# Patient Record
Sex: FEMALE | Race: WHITE | Employment: FULL TIME | URBAN - METROPOLITAN AREA
[De-identification: names, ages, dates, MRNs, and addresses within clinical notes are randomized per-mention and may not be internally consistent; named-entity substitution may affect disease eponyms.]

---

## 2018-01-31 PROBLEM — D64.9 ANEMIA: Status: ACTIVE | Noted: 2018-01-31

## 2018-03-27 ENCOUNTER — HOSPITAL ENCOUNTER (OUTPATIENT)
Age: 33
Discharge: HOME | End: 2018-03-27
Payer: COMMERCIAL

## 2018-03-27 LAB
ALBUMIN SERPL-MCNC: 4 G/DL (ref 3.5–5.2)
ALP BLD-CCNC: 43 U/L (ref 35–104)
ALT SERPL-CCNC: 10 U/L (ref 0–32)
ANION GAP SERPL CALCULATED.3IONS-SCNC: 13 MMOL/L (ref 7–16)
AST SERPL-CCNC: 10 U/L (ref 0–31)
BILIRUB SERPL-MCNC: 0.2 MG/DL (ref 0–1.2)
BUN BLDV-MCNC: 14 MG/DL (ref 6–20)
CALCIUM SERPL-MCNC: 9.4 MG/DL (ref 8.6–10.2)
CHLORIDE BLD-SCNC: 101 MMOL/L (ref 98–107)
CO2: 25 MMOL/L (ref 22–29)
CREAT SERPL-MCNC: 0.7 MG/DL (ref 0.5–1)
GFR AFRICAN AMERICAN: >60
GFR NON-AFRICAN AMERICAN: >60 ML/MIN/1.73
GLUCOSE BLD-MCNC: 82 MG/DL (ref 74–109)
HCT VFR BLD CALC: 25.5 % (ref 34–48)
HEMOGLOBIN: 7.1 G/DL (ref 11.5–15.5)
IRON SATURATION: 5 % (ref 15–50)
IRON: 17 MCG/DL (ref 37–145)
MCH RBC QN AUTO: 18.8 PG (ref 26–35)
MCHC RBC AUTO-ENTMCNC: 27.8 % (ref 32–34.5)
MCV RBC AUTO: 67.5 FL (ref 80–99.9)
PDW BLD-RTO: 24.7 FL (ref 11.5–15)
PLATELET # BLD: 436 E9/L (ref 130–450)
PMV BLD AUTO: 9.6 FL (ref 7–12)
POTASSIUM SERPL-SCNC: 4.2 MMOL/L (ref 3.5–5)
RBC # BLD: 3.78 E12/L (ref 3.5–5.5)
SODIUM BLD-SCNC: 139 MMOL/L (ref 132–146)
TOTAL IRON BINDING CAPACITY: 374 MCG/DL (ref 250–450)
TOTAL PROTEIN: 7 G/DL (ref 6.4–8.3)
WBC # BLD: 11 E9/L (ref 4.5–11.5)

## 2018-03-27 PROCEDURE — 36415 COLL VENOUS BLD VENIPUNCTURE: CPT

## 2018-03-27 PROCEDURE — 83550 IRON BINDING TEST: CPT

## 2018-03-27 PROCEDURE — 83540 ASSAY OF IRON: CPT

## 2018-03-27 PROCEDURE — 80053 COMPREHEN METABOLIC PANEL: CPT

## 2018-03-27 PROCEDURE — 85027 COMPLETE CBC AUTOMATED: CPT

## 2018-06-07 ENCOUNTER — HOSPITAL ENCOUNTER (INPATIENT)
Age: 33
LOS: 1 days | Discharge: HOME | DRG: 532 | End: 2018-06-07
Attending: INTERNAL MEDICINE | Admitting: INTERNAL MEDICINE
Payer: COMMERCIAL

## 2018-06-07 ENCOUNTER — HOSPITAL ENCOUNTER (OUTPATIENT)
Age: 33
Discharge: HOME | End: 2018-06-07
Payer: COMMERCIAL

## 2018-06-07 ENCOUNTER — HOSPITAL ENCOUNTER (EMERGENCY)
Age: 33
Discharge: ANOTHER HOSPITAL | DRG: 663 | End: 2018-06-07
Attending: EMERGENCY MEDICINE | Admitting: HOSPITALIST
Payer: COMMERCIAL

## 2018-06-07 VITALS
HEIGHT: 68 IN | OXYGEN SATURATION: 98 % | SYSTOLIC BLOOD PRESSURE: 130 MMHG | HEART RATE: 88 BPM | TEMPERATURE: 98.3 F | RESPIRATION RATE: 16 BRPM | DIASTOLIC BLOOD PRESSURE: 74 MMHG | WEIGHT: 280 LBS | BODY MASS INDEX: 42.44 KG/M2

## 2018-06-07 VITALS
HEART RATE: 77 BPM | TEMPERATURE: 98.4 F | WEIGHT: 269.1 LBS | HEIGHT: 68 IN | OXYGEN SATURATION: 98 % | DIASTOLIC BLOOD PRESSURE: 56 MMHG | BODY MASS INDEX: 40.78 KG/M2 | SYSTOLIC BLOOD PRESSURE: 119 MMHG | RESPIRATION RATE: 17 BRPM

## 2018-06-07 DIAGNOSIS — N92.4 MENORRHAGIA, PREMENOPAUSAL: ICD-10-CM

## 2018-06-07 DIAGNOSIS — D64.9 ANEMIA, UNSPECIFIED TYPE: ICD-10-CM

## 2018-06-07 DIAGNOSIS — D64.89 ANEMIA DUE TO OTHER CAUSE, NOT CLASSIFIED: Primary | ICD-10-CM

## 2018-06-07 PROBLEM — N93.8 DYSFUNCTIONAL UTERINE BLEEDING: Chronic | Status: ACTIVE | Noted: 2018-06-07

## 2018-06-07 PROBLEM — K27.9 PUD (PEPTIC ULCER DISEASE): Chronic | Status: ACTIVE | Noted: 2018-06-07

## 2018-06-07 PROBLEM — E66.01 MORBID OBESITY WITH BMI OF 40.0-44.9, ADULT (HCC): Chronic | Status: ACTIVE | Noted: 2018-06-07

## 2018-06-07 LAB
ABO/RH: NORMAL
ALBUMIN SERPL-MCNC: 3.9 G/DL (ref 3.5–5.2)
ALP BLD-CCNC: 50 U/L (ref 35–104)
ALT SERPL-CCNC: 8 U/L (ref 0–32)
ANION GAP SERPL CALCULATED.3IONS-SCNC: 14 MMOL/L (ref 7–16)
ANISOCYTOSIS: ABNORMAL
ANTIBODY SCREEN: NORMAL
AST SERPL-CCNC: 8 U/L (ref 0–31)
BACTERIA: ABNORMAL /HPF
BASOPHILS ABSOLUTE: 0.02 E9/L (ref 0–0.2)
BASOPHILS RELATIVE PERCENT: 0.2 % (ref 0–2)
BILIRUB SERPL-MCNC: 0.2 MG/DL (ref 0–1.2)
BILIRUBIN URINE: NEGATIVE
BLOOD BANK DISPENSE STATUS: NORMAL
BLOOD BANK DISPENSE STATUS: NORMAL
BLOOD BANK PRODUCT CODE: NORMAL
BLOOD BANK PRODUCT CODE: NORMAL
BLOOD, URINE: ABNORMAL
BPU ID: NORMAL
BPU ID: NORMAL
BUN BLDV-MCNC: 14 MG/DL (ref 6–20)
CALCIUM SERPL-MCNC: 8.9 MG/DL (ref 8.6–10.2)
CHLORIDE BLD-SCNC: 101 MMOL/L (ref 98–107)
CLARITY: ABNORMAL
CO2: 24 MMOL/L (ref 22–29)
COLOR: YELLOW
CREAT SERPL-MCNC: 0.8 MG/DL (ref 0.5–1)
DESCRIPTION BLOOD BANK: NORMAL
DESCRIPTION BLOOD BANK: NORMAL
EOSINOPHILS ABSOLUTE: 0.14 E9/L (ref 0.05–0.5)
EOSINOPHILS RELATIVE PERCENT: 1.1 % (ref 0–6)
FERRITIN: 3 NG/ML
FOLATE: 8.9 NG/ML (ref 4.8–24.2)
GFR AFRICAN AMERICAN: >60
GFR NON-AFRICAN AMERICAN: >60 ML/MIN/1.73
GLUCOSE BLD-MCNC: 145 MG/DL (ref 74–109)
GLUCOSE URINE: NEGATIVE MG/DL
HCG(URINE) PREGNANCY TEST: NEGATIVE
HCT VFR BLD CALC: 21.2 % (ref 34–48)
HCT VFR BLD CALC: 23.5 % (ref 34–48)
HEMOGLOBIN: 5.6 G/DL (ref 11.5–15.5)
HEMOGLOBIN: 6.2 G/DL (ref 11.5–15.5)
HYPOCHROMIA: ABNORMAL
IMMATURE GRANULOCYTES #: 0.06 E9/L
IMMATURE GRANULOCYTES %: 0.5 % (ref 0–5)
IMMATURE RETIC FRACT: 32.3 % (ref 3–15.9)
IRON SATURATION: 5 % (ref 15–50)
IRON: 16 MCG/DL (ref 37–145)
KETONES, URINE: NEGATIVE MG/DL
LACTIC ACID: 2.2 MMOL/L (ref 0.5–2.2)
LEUKOCYTE ESTERASE, URINE: NEGATIVE
LIPASE: 22 U/L (ref 13–60)
LYMPHOCYTES ABSOLUTE: 2.29 E9/L (ref 1.5–4)
LYMPHOCYTES RELATIVE PERCENT: 17.7 % (ref 20–42)
MCH RBC QN AUTO: 16.3 PG (ref 26–35)
MCH RBC QN AUTO: 16.5 PG (ref 26–35)
MCHC RBC AUTO-ENTMCNC: 26.4 % (ref 32–34.5)
MCHC RBC AUTO-ENTMCNC: 26.4 % (ref 32–34.5)
MCV RBC AUTO: 61.7 FL (ref 80–99.9)
MCV RBC AUTO: 62.4 FL (ref 80–99.9)
MONOCYTES ABSOLUTE: 0.47 E9/L (ref 0.1–0.95)
MONOCYTES RELATIVE PERCENT: 3.6 % (ref 2–12)
NEUTROPHILS ABSOLUTE: 9.93 E9/L (ref 1.8–7.3)
NEUTROPHILS RELATIVE PERCENT: 76.9 % (ref 43–80)
NITRITE, URINE: NEGATIVE
OVALOCYTES: ABNORMAL
PATHOLOGIST REVIEW: NORMAL
PDW BLD-RTO: 22.5 FL (ref 11.5–15)
PDW BLD-RTO: 22.5 FL (ref 11.5–15)
PH UA: 6 (ref 5–9)
PLATELET # BLD: 445 E9/L (ref 130–450)
PLATELET # BLD: 505 E9/L (ref 130–450)
PMV BLD AUTO: 10.8 FL (ref 7–12)
PMV BLD AUTO: 10.9 FL (ref 7–12)
POIKILOCYTES: ABNORMAL
POLYCHROMASIA: ABNORMAL
POTASSIUM SERPL-SCNC: 4 MMOL/L (ref 3.5–5)
PROTEIN UA: NEGATIVE MG/DL
RBC # BLD: 3.4 E12/L (ref 3.5–5.5)
RBC # BLD: 3.81 E12/L (ref 3.5–5.5)
RBC UA: >20 /HPF (ref 0–2)
RETIC HGB EQUIVALENT: 14.9 PG (ref 28.2–36.6)
RETICULOCYTE ABSOLUTE COUNT: 0.07 E12/L
RETICULOCYTE COUNT PCT: 2.2 % (ref 0.4–1.9)
SODIUM BLD-SCNC: 139 MMOL/L (ref 132–146)
SPECIFIC GRAVITY UA: 1.01 (ref 1–1.03)
TOTAL IRON BINDING CAPACITY: 343 MCG/DL (ref 250–450)
TOTAL PROTEIN: 6.7 G/DL (ref 6.4–8.3)
UROBILINOGEN, URINE: 0.2 E.U./DL
VITAMIN B-12: 414 PG/ML (ref 211–946)
WBC # BLD: 12.9 E9/L (ref 4.5–11.5)
WBC # BLD: 18.5 E9/L (ref 4.5–11.5)
WBC UA: ABNORMAL /HPF (ref 0–5)

## 2018-06-07 PROCEDURE — 81025 URINE PREGNANCY TEST: CPT

## 2018-06-07 PROCEDURE — 85027 COMPLETE CBC AUTOMATED: CPT

## 2018-06-07 PROCEDURE — G0378 HOSPITAL OBSERVATION PER HR: HCPCS

## 2018-06-07 PROCEDURE — 83540 ASSAY OF IRON: CPT

## 2018-06-07 PROCEDURE — 99285 EMERGENCY DEPT VISIT HI MDM: CPT

## 2018-06-07 PROCEDURE — 82728 ASSAY OF FERRITIN: CPT

## 2018-06-07 PROCEDURE — 86900 BLOOD TYPING SEROLOGIC ABO: CPT

## 2018-06-07 PROCEDURE — 96366 THER/PROPH/DIAG IV INF ADDON: CPT

## 2018-06-07 PROCEDURE — 80053 COMPREHEN METABOLIC PANEL: CPT

## 2018-06-07 PROCEDURE — A0428 BLS: HCPCS

## 2018-06-07 PROCEDURE — 2580000003 HC RX 258: Performed by: INTERNAL MEDICINE

## 2018-06-07 PROCEDURE — G0379 DIRECT REFER HOSPITAL OBSERV: HCPCS

## 2018-06-07 PROCEDURE — A0425 GROUND MILEAGE: HCPCS

## 2018-06-07 PROCEDURE — 36415 COLL VENOUS BLD VENIPUNCTURE: CPT

## 2018-06-07 PROCEDURE — 82746 ASSAY OF FOLIC ACID SERUM: CPT

## 2018-06-07 PROCEDURE — 86850 RBC ANTIBODY SCREEN: CPT

## 2018-06-07 PROCEDURE — 86901 BLOOD TYPING SEROLOGIC RH(D): CPT

## 2018-06-07 PROCEDURE — 83605 ASSAY OF LACTIC ACID: CPT

## 2018-06-07 PROCEDURE — P9016 RBC LEUKOCYTES REDUCED: HCPCS

## 2018-06-07 PROCEDURE — 36430 TRANSFUSION BLD/BLD COMPNT: CPT

## 2018-06-07 PROCEDURE — 85045 AUTOMATED RETICULOCYTE COUNT: CPT

## 2018-06-07 PROCEDURE — 96365 THER/PROPH/DIAG IV INF INIT: CPT

## 2018-06-07 PROCEDURE — 1200000000 HC SEMI PRIVATE

## 2018-06-07 PROCEDURE — 82607 VITAMIN B-12: CPT

## 2018-06-07 PROCEDURE — 81001 URINALYSIS AUTO W/SCOPE: CPT

## 2018-06-07 PROCEDURE — 83690 ASSAY OF LIPASE: CPT

## 2018-06-07 PROCEDURE — 6370000000 HC RX 637 (ALT 250 FOR IP): Performed by: INTERNAL MEDICINE

## 2018-06-07 PROCEDURE — 85025 COMPLETE CBC W/AUTO DIFF WBC: CPT

## 2018-06-07 PROCEDURE — 83550 IRON BINDING TEST: CPT

## 2018-06-07 PROCEDURE — 86920 COMPATIBILITY TEST SPIN: CPT

## 2018-06-07 PROCEDURE — 6360000002 HC RX W HCPCS: Performed by: INTERNAL MEDICINE

## 2018-06-07 RX ORDER — SODIUM CHLORIDE 0.9 % (FLUSH) 0.9 %
10 SYRINGE (ML) INJECTION EVERY 12 HOURS SCHEDULED
Status: DISCONTINUED | OUTPATIENT
Start: 2018-06-07 | End: 2018-06-07 | Stop reason: HOSPADM

## 2018-06-07 RX ORDER — PANTOPRAZOLE SODIUM 40 MG/1
40 TABLET, DELAYED RELEASE ORAL
Status: DISCONTINUED | OUTPATIENT
Start: 2018-06-07 | End: 2018-06-07 | Stop reason: HOSPADM

## 2018-06-07 RX ORDER — FERROUS SULFATE 325(65) MG
325 TABLET ORAL
Qty: 90 TABLET | Refills: 0 | Status: SHIPPED | OUTPATIENT
Start: 2018-06-07 | End: 2021-10-08

## 2018-06-07 RX ORDER — 0.9 % SODIUM CHLORIDE 0.9 %
250 INTRAVENOUS SOLUTION INTRAVENOUS ONCE
Status: DISCONTINUED | OUTPATIENT
Start: 2018-06-07 | End: 2018-06-07 | Stop reason: HOSPADM

## 2018-06-07 RX ORDER — CYANOCOBALAMIN 1000 UG/ML
1000 INJECTION INTRAMUSCULAR; SUBCUTANEOUS ONCE
Status: COMPLETED | OUTPATIENT
Start: 2018-06-07 | End: 2018-06-07

## 2018-06-07 RX ORDER — ONDANSETRON 2 MG/ML
4 INJECTION INTRAMUSCULAR; INTRAVENOUS EVERY 6 HOURS PRN
Status: DISCONTINUED | OUTPATIENT
Start: 2018-06-07 | End: 2018-06-07 | Stop reason: HOSPADM

## 2018-06-07 RX ORDER — ACETAMINOPHEN 325 MG/1
650 TABLET ORAL ONCE
Status: COMPLETED | OUTPATIENT
Start: 2018-06-07 | End: 2018-06-07

## 2018-06-07 RX ORDER — SODIUM CHLORIDE 0.9 % (FLUSH) 0.9 %
10 SYRINGE (ML) INJECTION PRN
Status: DISCONTINUED | OUTPATIENT
Start: 2018-06-07 | End: 2018-06-07 | Stop reason: HOSPADM

## 2018-06-07 RX ORDER — PANTOPRAZOLE SODIUM 40 MG/1
40 TABLET, DELAYED RELEASE ORAL
Qty: 30 TABLET | Refills: 0 | Status: SHIPPED | OUTPATIENT
Start: 2018-06-08 | End: 2019-05-31

## 2018-06-07 RX ADMIN — CYANOCOBALAMIN 1000 MCG: 1000 INJECTION, SOLUTION INTRAMUSCULAR at 08:39

## 2018-06-07 RX ADMIN — ACETAMINOPHEN 650 MG: 325 TABLET ORAL at 18:52

## 2018-06-07 RX ADMIN — Medication 10 ML: at 11:07

## 2018-06-07 RX ADMIN — SODIUM CHLORIDE 25 MG: 9 INJECTION, SOLUTION INTRAVENOUS at 08:39

## 2018-06-07 RX ADMIN — PANTOPRAZOLE SODIUM 40 MG: 40 TABLET, DELAYED RELEASE ORAL at 08:39

## 2018-06-07 RX ADMIN — Medication 10 ML: at 08:40

## 2018-06-07 RX ADMIN — SODIUM CHLORIDE 100 MG: 9 INJECTION, SOLUTION INTRAVENOUS at 09:56

## 2018-06-07 ASSESSMENT — PAIN DESCRIPTION - LOCATION
LOCATION: ABDOMEN
LOCATION: ABDOMEN
LOCATION: HEAD

## 2018-06-07 ASSESSMENT — PAIN DESCRIPTION - FREQUENCY
FREQUENCY: INTERMITTENT
FREQUENCY: INTERMITTENT
FREQUENCY: CONTINUOUS

## 2018-06-07 ASSESSMENT — PAIN DESCRIPTION - DESCRIPTORS
DESCRIPTORS: ACHING;STABBING
DESCRIPTORS: HEADACHE
DESCRIPTORS: SQUEEZING

## 2018-06-07 ASSESSMENT — PAIN DESCRIPTION - PAIN TYPE
TYPE: ACUTE PAIN

## 2018-06-07 ASSESSMENT — PAIN SCALES - GENERAL
PAINLEVEL_OUTOF10: 8
PAINLEVEL_OUTOF10: 7
PAINLEVEL_OUTOF10: 6
PAINLEVEL_OUTOF10: 0

## 2018-06-07 ASSESSMENT — PAIN DESCRIPTION - ONSET: ONSET: GRADUAL

## 2018-06-07 NOTE — ED PROVIDER NOTES
HPI:  6/7/18, Time: 1:25 AM         Janeen Seth is a 27 y.o. female presenting to the ED for menorrhagia and crampy abdominal pain, beginning 3 weeks ago. The patient states she has been symptomatic for approximately 3 weeks, but symptoms worsened over the last several days. Patient does have history for the last 12 months requiring a transfusion due to heavy vaginal bleeding or menorrhagia. She is complaining also of some upper abdominal discomfort. Patient denies any melanotic stools. Patient has remained stable her entire time here. Patient will be admitted for transfusion and further workup. I did speak with Dr. Twyla Lainez and he will accept the patient at the [address] [place] [address]. I also spoke with the gynecologist Dr. Beverly Noland. She is stable upon discharge. Patient denies fever/chills, cough, congestion, chest pain, shortness of breath, edema, headache, visual disturbances, focal paresthesias, focal weakness, abdominal pain, nausea, vomiting, diarrhea, constipation, dysuria, hematuria, trauma, neck or back pain or other complaints. ROS:   Pertinent positives and negatives are stated within HPI, all other systems reviewed and are negative.      --------------------------------------------- PAST HISTORY ---------------------------------------------  Past Medical History:  has a past medical history of Ovarian cyst.    Past Surgical History:  has a past surgical history that includes Endoscopy, colon, diagnostic (02/01/2018). Social History:  reports that she has been smoking Cigarettes. She has a 10.00 pack-year smoking history. She does not have any smokeless tobacco history on file. She reports that she does not drink alcohol or use drugs. Family History: family history is not on file. The patients home medications have been reviewed.     Allergies: Sulfa antibiotics        ---------------------------------------------------PHYSICAL EXAM--------------------------------------    Constitutional:  Well developed, well nourished, no acute distress, non-toxic appearance   Eyes:  PERRL, conjunctiva normal, EOMI, sclera is pale. HENT:  Atraumatic, external ears normal, nose normal, oropharynx moist. Neck- normal range of motion, no tenderness, supple   Respiratory:  No respiratory distress, normal breath sounds, no rales, no wheezing   Cardiovascular:  Normal rate, normal rhythm, no murmurs, no gallops, no rubs. Radial and DP pulses 2+ bilaterally. GI:  Soft, nondistended, normal bowel sounds, nontender, no organomegaly, no mass, no rebound, no guarding   :  No costovertebral angle tenderness   Musculoskeletal:  No edema, no tenderness, no deformities. Back- no tenderness  Integument:  Well hydrated, no rash. Adequate perfusion. Lymphatic:  No lymphadenopathy noted   Neurologic:  Alert & oriented x 3, CN 2-12 normal, normal motor function, normal sensory function, no focal deficits noted. Psychiatric:  Speech and behavior appropriate       -------------------------------------------------- RESULTS -------------------------------------------------  I have personally reviewed all laboratory and imaging results for this patient. Results are listed below.      LABS:  Results for orders placed or performed during the hospital encounter of 06/07/18   Urinalysis   Result Value Ref Range    Color, UA Yellow Straw/Yellow    Clarity, UA CLOUDY (A) Clear    Glucose, Ur Negative Negative mg/dL    Bilirubin Urine Negative Negative    Ketones, Urine Negative Negative mg/dL    Specific Gravity, UA 1.015 1.005 - 1.030    Blood, Urine LARGE (A) Negative    pH, UA 6.0 5.0 - 9.0    Protein, UA Negative Negative mg/dL    Urobilinogen, Urine 0.2 <2.0 E.U./dL    Nitrite, Urine Negative Negative    Leukocyte Esterase, Urine Negative Negative   Pregnancy, urine   Result Value Ref Range    HCG(Urine) Pregnancy Test NEGATIVE NEGATIVE   Microscopic Urinalysis   Result Value Ref Range    WBC, UA 0-1 0 - 5 /HPF    RBC, UA >20 0 - 2 /HPF

## 2018-06-07 NOTE — ED NOTES
Patient aware of potential admission and wait for return phone call from PMD. Skin WNL without any open areas.       Jj Biswas RN  06/07/18 9429

## 2018-07-17 ENCOUNTER — HOSPITAL ENCOUNTER (OUTPATIENT)
Age: 33
Discharge: HOME | End: 2018-07-17
Payer: COMMERCIAL

## 2018-07-17 LAB
ABO/RH: NORMAL
ANTIBODY SCREEN: NORMAL
HCG QUALITATIVE: NEGATIVE
HCT VFR BLD CALC: 25.2 % (ref 34–48)
HEMOGLOBIN: 7.1 G/DL (ref 11.5–15.5)
MCH RBC QN AUTO: 19.8 PG (ref 26–35)
MCHC RBC AUTO-ENTMCNC: 28.2 % (ref 32–34.5)
MCV RBC AUTO: 70.4 FL (ref 80–99.9)
PDW BLD-RTO: 25.9 FL (ref 11.5–15)
PLATELET # BLD: 368 E9/L (ref 130–450)
PMV BLD AUTO: 9.8 FL (ref 7–12)
RBC # BLD: 3.58 E12/L (ref 3.5–5.5)
WBC # BLD: 10.8 E9/L (ref 4.5–11.5)

## 2018-07-17 PROCEDURE — 86901 BLOOD TYPING SEROLOGIC RH(D): CPT

## 2018-07-17 PROCEDURE — 86923 COMPATIBILITY TEST ELECTRIC: CPT

## 2018-07-17 PROCEDURE — 84703 CHORIONIC GONADOTROPIN ASSAY: CPT

## 2018-07-17 PROCEDURE — 86850 RBC ANTIBODY SCREEN: CPT

## 2018-07-17 PROCEDURE — 36415 COLL VENOUS BLD VENIPUNCTURE: CPT

## 2018-07-17 PROCEDURE — 85027 COMPLETE CBC AUTOMATED: CPT

## 2018-07-17 PROCEDURE — 86900 BLOOD TYPING SEROLOGIC ABO: CPT

## 2018-07-17 NOTE — PROGRESS NOTES
Phone call to Filomena Love at Dr. Nita Moreno office re:orders and H&P, she will call the doctor for orders, he will do H&P at bedside.

## 2018-07-18 ENCOUNTER — HOSPITAL ENCOUNTER (OUTPATIENT)
Age: 33
Setting detail: OUTPATIENT SURGERY
Discharge: HOME | End: 2018-07-18
Attending: OBSTETRICS & GYNECOLOGY | Admitting: OBSTETRICS & GYNECOLOGY
Payer: COMMERCIAL

## 2018-07-18 ENCOUNTER — ANESTHESIA EVENT (OUTPATIENT)
Dept: OPERATING ROOM | Age: 33
End: 2018-07-18
Payer: COMMERCIAL

## 2018-07-18 ENCOUNTER — ANESTHESIA (OUTPATIENT)
Dept: OPERATING ROOM | Age: 33
End: 2018-07-18
Payer: COMMERCIAL

## 2018-07-18 VITALS
SYSTOLIC BLOOD PRESSURE: 112 MMHG | DIASTOLIC BLOOD PRESSURE: 56 MMHG | OXYGEN SATURATION: 100 % | RESPIRATION RATE: 3 BRPM

## 2018-07-18 VITALS
BODY MASS INDEX: 40.47 KG/M2 | TEMPERATURE: 96.3 F | RESPIRATION RATE: 16 BRPM | DIASTOLIC BLOOD PRESSURE: 60 MMHG | OXYGEN SATURATION: 96 % | SYSTOLIC BLOOD PRESSURE: 114 MMHG | WEIGHT: 267 LBS | HEIGHT: 68 IN | HEART RATE: 67 BPM

## 2018-07-18 PROCEDURE — 6360000002 HC RX W HCPCS

## 2018-07-18 PROCEDURE — 2580000003 HC RX 258: Performed by: OBSTETRICS & GYNECOLOGY

## 2018-07-18 PROCEDURE — 7100000010 HC PHASE II RECOVERY - FIRST 15 MIN: Performed by: OBSTETRICS & GYNECOLOGY

## 2018-07-18 PROCEDURE — 88305 TISSUE EXAM BY PATHOLOGIST: CPT

## 2018-07-18 PROCEDURE — 2500000003 HC RX 250 WO HCPCS: Performed by: NURSE ANESTHETIST, CERTIFIED REGISTERED

## 2018-07-18 PROCEDURE — 3600000003 HC SURGERY LEVEL 3 BASE: Performed by: OBSTETRICS & GYNECOLOGY

## 2018-07-18 PROCEDURE — 6360000002 HC RX W HCPCS: Performed by: NURSE ANESTHETIST, CERTIFIED REGISTERED

## 2018-07-18 PROCEDURE — 7100000011 HC PHASE II RECOVERY - ADDTL 15 MIN: Performed by: OBSTETRICS & GYNECOLOGY

## 2018-07-18 PROCEDURE — 3700000000 HC ANESTHESIA ATTENDED CARE: Performed by: OBSTETRICS & GYNECOLOGY

## 2018-07-18 PROCEDURE — 6360000002 HC RX W HCPCS: Performed by: ANESTHESIOLOGY

## 2018-07-18 PROCEDURE — 2580000003 HC RX 258: Performed by: NURSE ANESTHETIST, CERTIFIED REGISTERED

## 2018-07-18 PROCEDURE — 3600000013 HC SURGERY LEVEL 3 ADDTL 15MIN: Performed by: OBSTETRICS & GYNECOLOGY

## 2018-07-18 PROCEDURE — 7100000000 HC PACU RECOVERY - FIRST 15 MIN: Performed by: OBSTETRICS & GYNECOLOGY

## 2018-07-18 PROCEDURE — 7100000001 HC PACU RECOVERY - ADDTL 15 MIN: Performed by: OBSTETRICS & GYNECOLOGY

## 2018-07-18 PROCEDURE — 3700000001 HC ADD 15 MINUTES (ANESTHESIA): Performed by: OBSTETRICS & GYNECOLOGY

## 2018-07-18 PROCEDURE — 2709999900 HC NON-CHARGEABLE SUPPLY: Performed by: OBSTETRICS & GYNECOLOGY

## 2018-07-18 RX ORDER — FENTANYL CITRATE 50 UG/ML
INJECTION, SOLUTION INTRAMUSCULAR; INTRAVENOUS PRN
Status: DISCONTINUED | OUTPATIENT
Start: 2018-07-18 | End: 2018-07-18 | Stop reason: SDUPTHER

## 2018-07-18 RX ORDER — MEPERIDINE HYDROCHLORIDE 25 MG/ML
INJECTION INTRAMUSCULAR; INTRAVENOUS; SUBCUTANEOUS
Status: COMPLETED
Start: 2018-07-18 | End: 2018-07-18

## 2018-07-18 RX ORDER — SODIUM CHLORIDE 0.9 % (FLUSH) 0.9 %
10 SYRINGE (ML) INJECTION PRN
Status: DISCONTINUED | OUTPATIENT
Start: 2018-07-18 | End: 2018-07-18 | Stop reason: HOSPADM

## 2018-07-18 RX ORDER — MIDAZOLAM HYDROCHLORIDE 1 MG/ML
INJECTION INTRAMUSCULAR; INTRAVENOUS PRN
Status: DISCONTINUED | OUTPATIENT
Start: 2018-07-18 | End: 2018-07-18 | Stop reason: SDUPTHER

## 2018-07-18 RX ORDER — SODIUM CHLORIDE 0.9 % (FLUSH) 0.9 %
10 SYRINGE (ML) INJECTION EVERY 12 HOURS SCHEDULED
Status: DISCONTINUED | OUTPATIENT
Start: 2018-07-18 | End: 2018-07-18 | Stop reason: HOSPADM

## 2018-07-18 RX ORDER — FENTANYL CITRATE 50 UG/ML
25 INJECTION, SOLUTION INTRAMUSCULAR; INTRAVENOUS EVERY 5 MIN PRN
Status: DISCONTINUED | OUTPATIENT
Start: 2018-07-18 | End: 2018-07-18 | Stop reason: HOSPADM

## 2018-07-18 RX ORDER — DEXAMETHASONE SODIUM PHOSPHATE 10 MG/ML
INJECTION, SOLUTION INTRAMUSCULAR; INTRAVENOUS PRN
Status: DISCONTINUED | OUTPATIENT
Start: 2018-07-18 | End: 2018-07-18 | Stop reason: SDUPTHER

## 2018-07-18 RX ORDER — SODIUM CHLORIDE, SODIUM LACTATE, POTASSIUM CHLORIDE, CALCIUM CHLORIDE 600; 310; 30; 20 MG/100ML; MG/100ML; MG/100ML; MG/100ML
INJECTION, SOLUTION INTRAVENOUS CONTINUOUS PRN
Status: DISCONTINUED | OUTPATIENT
Start: 2018-07-18 | End: 2018-07-18 | Stop reason: SDUPTHER

## 2018-07-18 RX ORDER — HYDROCODONE BITARTRATE AND ACETAMINOPHEN 5; 325 MG/1; MG/1
2 TABLET ORAL PRN
Status: DISCONTINUED | OUTPATIENT
Start: 2018-07-18 | End: 2018-07-18 | Stop reason: HOSPADM

## 2018-07-18 RX ORDER — FENTANYL CITRATE 50 UG/ML
INJECTION, SOLUTION INTRAMUSCULAR; INTRAVENOUS
Status: DISCONTINUED
Start: 2018-07-18 | End: 2018-07-18 | Stop reason: HOSPADM

## 2018-07-18 RX ORDER — GLYCOPYRROLATE 1 MG/5 ML
SYRINGE (ML) INTRAVENOUS PRN
Status: DISCONTINUED | OUTPATIENT
Start: 2018-07-18 | End: 2018-07-18 | Stop reason: SDUPTHER

## 2018-07-18 RX ORDER — KETOROLAC TROMETHAMINE 30 MG/ML
INJECTION, SOLUTION INTRAMUSCULAR; INTRAVENOUS PRN
Status: DISCONTINUED | OUTPATIENT
Start: 2018-07-18 | End: 2018-07-18 | Stop reason: SDUPTHER

## 2018-07-18 RX ORDER — PROPOFOL 10 MG/ML
INJECTION, EMULSION INTRAVENOUS PRN
Status: DISCONTINUED | OUTPATIENT
Start: 2018-07-18 | End: 2018-07-18 | Stop reason: SDUPTHER

## 2018-07-18 RX ORDER — ONDANSETRON 2 MG/ML
INJECTION INTRAMUSCULAR; INTRAVENOUS PRN
Status: DISCONTINUED | OUTPATIENT
Start: 2018-07-18 | End: 2018-07-18 | Stop reason: SDUPTHER

## 2018-07-18 RX ORDER — KETOROLAC TROMETHAMINE 10 MG/1
10 TABLET, FILM COATED ORAL EVERY 6 HOURS PRN
Qty: 20 TABLET | Refills: 0 | Status: SHIPPED | OUTPATIENT
Start: 2018-07-18 | End: 2019-05-31

## 2018-07-18 RX ORDER — ONDANSETRON 2 MG/ML
4 INJECTION INTRAMUSCULAR; INTRAVENOUS EVERY 6 HOURS PRN
Status: DISCONTINUED | OUTPATIENT
Start: 2018-07-18 | End: 2018-07-18 | Stop reason: HOSPADM

## 2018-07-18 RX ORDER — LIDOCAINE HYDROCHLORIDE 20 MG/ML
INJECTION, SOLUTION INFILTRATION; PERINEURAL PRN
Status: DISCONTINUED | OUTPATIENT
Start: 2018-07-18 | End: 2018-07-18 | Stop reason: SDUPTHER

## 2018-07-18 RX ORDER — MEPERIDINE HYDROCHLORIDE 25 MG/ML
12.5 INJECTION INTRAMUSCULAR; INTRAVENOUS; SUBCUTANEOUS EVERY 5 MIN PRN
Status: DISCONTINUED | OUTPATIENT
Start: 2018-07-18 | End: 2018-07-18 | Stop reason: HOSPADM

## 2018-07-18 RX ORDER — SODIUM CHLORIDE, SODIUM LACTATE, POTASSIUM CHLORIDE, CALCIUM CHLORIDE 600; 310; 30; 20 MG/100ML; MG/100ML; MG/100ML; MG/100ML
INJECTION, SOLUTION INTRAVENOUS CONTINUOUS
Status: DISCONTINUED | OUTPATIENT
Start: 2018-07-18 | End: 2018-07-18 | Stop reason: HOSPADM

## 2018-07-18 RX ORDER — HYDROCODONE BITARTRATE AND ACETAMINOPHEN 5; 325 MG/1; MG/1
1 TABLET ORAL PRN
Status: DISCONTINUED | OUTPATIENT
Start: 2018-07-18 | End: 2018-07-18 | Stop reason: HOSPADM

## 2018-07-18 RX ORDER — MORPHINE SULFATE 2 MG/ML
2 INJECTION, SOLUTION INTRAMUSCULAR; INTRAVENOUS EVERY 5 MIN PRN
Status: DISCONTINUED | OUTPATIENT
Start: 2018-07-18 | End: 2018-07-18 | Stop reason: HOSPADM

## 2018-07-18 RX ADMIN — KETOROLAC TROMETHAMINE 30 MG: 30 INJECTION, SOLUTION INTRAMUSCULAR; INTRAVENOUS at 10:08

## 2018-07-18 RX ADMIN — SODIUM CHLORIDE, POTASSIUM CHLORIDE, SODIUM LACTATE AND CALCIUM CHLORIDE: 600; 310; 30; 20 INJECTION, SOLUTION INTRAVENOUS at 09:43

## 2018-07-18 RX ADMIN — DEXAMETHASONE SODIUM PHOSPHATE 10 MG: 10 INJECTION, SOLUTION INTRAMUSCULAR; INTRAVENOUS at 10:06

## 2018-07-18 RX ADMIN — FENTANYL CITRATE 50 MCG: 50 INJECTION, SOLUTION INTRAMUSCULAR; INTRAVENOUS at 09:50

## 2018-07-18 RX ADMIN — ONDANSETRON HYDROCHLORIDE 4 MG: 2 INJECTION, SOLUTION INTRAMUSCULAR; INTRAVENOUS at 10:06

## 2018-07-18 RX ADMIN — Medication 0.2 MG: at 09:58

## 2018-07-18 RX ADMIN — FENTANYL CITRATE 25 MCG: 50 INJECTION INTRAMUSCULAR; INTRAVENOUS at 10:55

## 2018-07-18 RX ADMIN — LIDOCAINE HYDROCHLORIDE 60 MG: 20 INJECTION, SOLUTION INFILTRATION; PERINEURAL at 09:50

## 2018-07-18 RX ADMIN — MIDAZOLAM 2 MG: 1 INJECTION INTRAMUSCULAR; INTRAVENOUS at 09:43

## 2018-07-18 RX ADMIN — MEPERIDINE HYDROCHLORIDE 12.5 MG: 25 INJECTION INTRAMUSCULAR; INTRAVENOUS; SUBCUTANEOUS at 10:28

## 2018-07-18 RX ADMIN — PROPOFOL 150 MG: 10 INJECTION, EMULSION INTRAVENOUS at 09:50

## 2018-07-18 RX ADMIN — SODIUM CHLORIDE, POTASSIUM CHLORIDE, SODIUM LACTATE AND CALCIUM CHLORIDE: 600; 310; 30; 20 INJECTION, SOLUTION INTRAVENOUS at 08:37

## 2018-07-18 RX ADMIN — FENTANYL CITRATE 25 MCG: 50 INJECTION INTRAMUSCULAR; INTRAVENOUS at 11:00

## 2018-07-18 ASSESSMENT — PULMONARY FUNCTION TESTS
PIF_VALUE: 2
PIF_VALUE: 18
PIF_VALUE: 18
PIF_VALUE: 14
PIF_VALUE: 22
PIF_VALUE: 3
PIF_VALUE: 17
PIF_VALUE: 18
PIF_VALUE: 18
PIF_VALUE: 3
PIF_VALUE: 18
PIF_VALUE: 22
PIF_VALUE: 4
PIF_VALUE: 17
PIF_VALUE: 18
PIF_VALUE: 0
PIF_VALUE: 18
PIF_VALUE: 0
PIF_VALUE: 1
PIF_VALUE: 18
PIF_VALUE: 17
PIF_VALUE: 3
PIF_VALUE: 18
PIF_VALUE: 21
PIF_VALUE: 18
PIF_VALUE: 17
PIF_VALUE: 18
PIF_VALUE: 21
PIF_VALUE: 18
PIF_VALUE: 19
PIF_VALUE: 17

## 2018-07-18 ASSESSMENT — PAIN SCALES - GENERAL
PAINLEVEL_OUTOF10: 2
PAINLEVEL_OUTOF10: 5
PAINLEVEL_OUTOF10: 6
PAINLEVEL_OUTOF10: 0
PAINLEVEL_OUTOF10: 4

## 2018-07-18 ASSESSMENT — LIFESTYLE VARIABLES: SMOKING_STATUS: 1

## 2018-07-18 ASSESSMENT — PAIN DESCRIPTION - FREQUENCY: FREQUENCY: CONTINUOUS

## 2018-07-18 ASSESSMENT — PAIN DESCRIPTION - DESCRIPTORS
DESCRIPTORS: CRAMPING
DESCRIPTORS: CRAMPING

## 2018-07-18 ASSESSMENT — PAIN DESCRIPTION - PAIN TYPE
TYPE: ACUTE PAIN;SURGICAL PAIN
TYPE: SURGICAL PAIN

## 2018-07-18 ASSESSMENT — PAIN DESCRIPTION - LOCATION
LOCATION: ABDOMEN
LOCATION: ABDOMEN

## 2018-07-18 ASSESSMENT — PAIN DESCRIPTION - PROGRESSION: CLINICAL_PROGRESSION: NOT CHANGED

## 2018-07-18 ASSESSMENT — PAIN - FUNCTIONAL ASSESSMENT: PAIN_FUNCTIONAL_ASSESSMENT: 0-10

## 2018-07-18 ASSESSMENT — PAIN DESCRIPTION - ONSET: ONSET: ON-GOING

## 2018-07-18 NOTE — ANESTHESIA PRE PROCEDURE
Department of Anesthesiology  Preprocedure Note       Name:  Agustín Bar   Age:  26 y.o.  :  1985                                          MRN:  [id number]         Date:  2018      Surgeon: Sergio Brush):  Renaldo Webb MD    Procedure: Procedure(s):  DILATATION AND CURETTAGE HYSTEROSCOPY, NOVASURE ABLATION    Medications prior to admission:   Prior to Admission medications    Medication Sig Start Date End Date Taking? Authorizing Provider   pantoprazole (PROTONIX) 40 MG tablet Take 1 tablet by mouth every morning (before breakfast) 18  Yes Ryanne Ortiz MD   ferrous sulfate (TONY-DANNIE) 325 (65 Fe) MG tablet Take 1 tablet by mouth 3 times daily (with meals) 18   Ryanne Ortiz MD       Current medications:    Current Facility-Administered Medications   Medication Dose Route Frequency Provider Last Rate Last Dose    lactated ringers infusion   Intravenous Continuous Renaldo Webb MD        sodium chloride flush 0.9 % injection 10 mL  10 mL Intravenous 2 times per day Renaldo Webb MD        sodium chloride flush 0.9 % injection 10 mL  10 mL Intravenous PRN Renaldo Webb MD        ondansetron (ZOFRAN) injection 4 mg  4 mg Intravenous Q6H PRN Renaldo Webb MD           Allergies:     Allergies   Allergen Reactions    Sulfa Antibiotics Rash       Problem List:    Patient Active Problem List   Diagnosis Code    Anemia D64.9    PUD (peptic ulcer disease) K27.9    Morbid obesity with BMI of 40.0-44.9, adult ([facility] E66.01, Z68.41    Dysfunctional uterine bleeding N93.8       Past Medical History:        Diagnosis Date    Anemia     GI (gastrointestinal bleed)     Ovarian cyst        Past Surgical History:        Procedure Laterality Date    COLONOSCOPY      CYST REMOVAL Left     wrist    ENDOSCOPY, COLON, DIAGNOSTIC  2018       Social History:    Social History   Substance Use Topics    Smoking status: Current Every Day Smoker     Packs/day: 1.00     Years: 10.00     Types: Cigarettes    Smokeless tobacco: Never Used    Alcohol use No                                Ready to quit: Not Answered  Counseling given: Not Answered      Vital Signs (Current):   Vitals:    07/17/18 0854 07/18/18 0814   BP:  120/82   Pulse:  80   Resp:  16   Temp:  98.5 °F (36.9 °C)   TempSrc:  Temporal   SpO2:  98%   Weight: 265 lb (120.2 kg) 267 lb (121.1 kg)   Height: 5' 8\" (1.727 m)                                               BP Readings from Last 3 Encounters:   07/18/18 120/82   06/07/18 (!) 119/56   06/07/18 130/74       NPO Status: Time of last liquid consumption: 2000                        Time of last solid consumption: 1800                        Date of last liquid consumption: 07/17/18                        Date of last solid food consumption: 07/17/18    BMI:   Wt Readings from Last 3 Encounters:   07/18/18 267 lb (121.1 kg)   06/07/18 269 lb 1.6 oz (122.1 kg)   06/07/18 280 lb (127 kg)     Body mass index is 40.6 kg/m². CBC:   Lab Results   Component Value Date    WBC 10.8 07/17/2018    RBC 3.58 07/17/2018    HGB 7.1 07/17/2018    HCT 25.2 07/17/2018    MCV 70.4 07/17/2018    RDW 25.9 07/17/2018     07/17/2018       CMP:   Lab Results   Component Value Date     06/07/2018    K 4.0 06/07/2018     06/07/2018    CO2 24 06/07/2018    BUN 14 06/07/2018    CREATININE 0.8 06/07/2018    GFRAA >60 06/07/2018    LABGLOM >60 06/07/2018    GLUCOSE 145 06/07/2018    GLUCOSE 92 02/11/2011    PROT 6.7 06/07/2018    CALCIUM 8.9 06/07/2018    BILITOT 0.2 06/07/2018    ALKPHOS 50 06/07/2018    AST 8 06/07/2018    ALT 8 06/07/2018       POC Tests: No results for input(s): POCGLU, POCNA, POCK, POCCL, POCBUN, POCHEMO, POCHCT in the last 72 hours.     Coags:   Lab Results   Component Value Date    PROTIME 12.3 06/14/2013    INR 1.2 06/14/2013       HCG (If Applicable):   Lab Results   Component Value Date    PREGTESTUR NEGATIVE 06/07/2018        ABGs: No results found for: PHART, PO2ART, OYJ6QJQ, IOS5TER, BEART, V6KJXIWW     Type & Screen (If Applicable):  No results found for: LABABO, [address]    Anesthesia Evaluation  Patient summary reviewed no history of anesthetic complications:   Airway: Mallampati: I  TM distance: >3 FB   Neck ROM: full  Mouth opening: > = 3 FB Dental: normal exam         Pulmonary: breath sounds clear to auscultation  (+) current smoker                           Cardiovascular:Negative CV ROS            Rhythm: regular  Rate: normal                    Neuro/Psych:   Negative Neuro/Psych ROS              GI/Hepatic/Renal:   (+) PUD, morbid obesity          Endo/Other:    (+) blood dyscrasia: anemia:., .                  ROS comment: Dysfunctional uterine bleeding Abdominal:   (+) obese,         Vascular: negative vascular ROS. Anesthesia Plan      general     ASA 3       Induction: intravenous. MIPS: Postoperative opioids intended and Prophylactic antiemetics administered. Anesthetic plan and risks discussed with patient. Use of blood products discussed with patient whom consented to blood products. Plan discussed with CRNA. DOS STAFF ADDENDUM:    Pt seen and examined, chart reviewed (including anesthesia, drug and allergy history). Anesthetic plan, risks, benefits, alternatives, and personnel involved discussed with patient. Patient verbalized an understanding and agrees to proceed. Plan discussed with care team members and agreed upon.     Jole Becker MD  Staff Anesthesiologist  8:33 AM    Joel Becker MD   7/18/2018

## 2018-07-18 NOTE — PROGRESS NOTES
Ambulated in the hallway, voided without difficulty. Mod amount bloody drainage on peripad. Requesting discharge, met criteria. Instructions reviewed including MyChart and prescription given for toradol.

## 2018-07-21 LAB
BLOOD BANK DISPENSE STATUS: NORMAL
BLOOD BANK DISPENSE STATUS: NORMAL
BLOOD BANK PRODUCT CODE: NORMAL
BLOOD BANK PRODUCT CODE: NORMAL
BPU ID: NORMAL
BPU ID: NORMAL
DESCRIPTION BLOOD BANK: NORMAL
DESCRIPTION BLOOD BANK: NORMAL

## 2018-10-31 ENCOUNTER — APPOINTMENT (OUTPATIENT)
Dept: GENERAL RADIOLOGY | Age: 33
End: 2018-10-31
Payer: COMMERCIAL

## 2018-10-31 ENCOUNTER — HOSPITAL ENCOUNTER (EMERGENCY)
Age: 33
Discharge: HOME | End: 2018-10-31
Attending: EMERGENCY MEDICINE
Payer: COMMERCIAL

## 2018-10-31 VITALS
RESPIRATION RATE: 18 BRPM | HEIGHT: 67 IN | OXYGEN SATURATION: 99 % | SYSTOLIC BLOOD PRESSURE: 118 MMHG | TEMPERATURE: 97.7 F | BODY MASS INDEX: 43 KG/M2 | WEIGHT: 274 LBS | DIASTOLIC BLOOD PRESSURE: 80 MMHG | HEART RATE: 84 BPM

## 2018-10-31 DIAGNOSIS — N93.8 DUB (DYSFUNCTIONAL UTERINE BLEEDING): ICD-10-CM

## 2018-10-31 DIAGNOSIS — D50.0 CHRONIC BLOOD LOSS ANEMIA: Primary | ICD-10-CM

## 2018-10-31 LAB
ABO/RH: NORMAL
ALBUMIN SERPL-MCNC: 4.2 G/DL (ref 3.5–5.2)
ALP BLD-CCNC: 48 U/L (ref 35–104)
ALT SERPL-CCNC: 11 U/L (ref 0–32)
ANION GAP SERPL CALCULATED.3IONS-SCNC: 13 MMOL/L (ref 7–16)
ANISOCYTOSIS: ABNORMAL
ANTIBODY SCREEN: NORMAL
APTT: 30.5 SEC (ref 24.5–35.1)
AST SERPL-CCNC: 15 U/L (ref 0–31)
BASOPHILS ABSOLUTE: 0.02 E9/L (ref 0–0.2)
BASOPHILS RELATIVE PERCENT: 0.2 % (ref 0–2)
BILIRUB SERPL-MCNC: 0.3 MG/DL (ref 0–1.2)
BUN BLDV-MCNC: 11 MG/DL (ref 6–20)
CALCIUM SERPL-MCNC: 9.2 MG/DL (ref 8.6–10.2)
CHLORIDE BLD-SCNC: 102 MMOL/L (ref 98–107)
CO2: 23 MMOL/L (ref 22–29)
CREAT SERPL-MCNC: 0.7 MG/DL (ref 0.5–1)
EOSINOPHILS ABSOLUTE: 0.16 E9/L (ref 0.05–0.5)
EOSINOPHILS RELATIVE PERCENT: 1.3 % (ref 0–6)
GFR AFRICAN AMERICAN: >60
GFR NON-AFRICAN AMERICAN: >60 ML/MIN/1.73
GLUCOSE BLD-MCNC: 94 MG/DL (ref 74–109)
HCT VFR BLD CALC: 24.9 % (ref 34–48)
HEMOGLOBIN: 6.6 G/DL (ref 11.5–15.5)
HYPOCHROMIA: ABNORMAL
IMMATURE GRANULOCYTES #: 0.09 E9/L
IMMATURE GRANULOCYTES %: 0.7 % (ref 0–5)
INR BLD: 1.2
LACTIC ACID: 1.2 MMOL/L (ref 0.5–2.2)
LYMPHOCYTES ABSOLUTE: 2.57 E9/L (ref 1.5–4)
LYMPHOCYTES RELATIVE PERCENT: 20.2 % (ref 20–42)
MCH RBC QN AUTO: 17 PG (ref 26–35)
MCHC RBC AUTO-ENTMCNC: 26.5 % (ref 32–34.5)
MCV RBC AUTO: 64.2 FL (ref 80–99.9)
MONOCYTES ABSOLUTE: 0.51 E9/L (ref 0.1–0.95)
MONOCYTES RELATIVE PERCENT: 4 % (ref 2–12)
NEUTROPHILS ABSOLUTE: 9.36 E9/L (ref 1.8–7.3)
NEUTROPHILS RELATIVE PERCENT: 73.6 % (ref 43–80)
OVALOCYTES: ABNORMAL
PDW BLD-RTO: 21.2 FL (ref 11.5–15)
PLATELET # BLD: 484 E9/L (ref 130–450)
PMV BLD AUTO: 10.8 FL (ref 7–12)
POIKILOCYTES: ABNORMAL
POLYCHROMASIA: ABNORMAL
POTASSIUM SERPL-SCNC: 3.8 MMOL/L (ref 3.5–5)
PROTHROMBIN TIME: 13.1 SEC (ref 9.3–12.4)
RBC # BLD: 3.88 E12/L (ref 3.5–5.5)
SCHISTOCYTES: ABNORMAL
SODIUM BLD-SCNC: 138 MMOL/L (ref 132–146)
TOTAL PROTEIN: 7.3 G/DL (ref 6.4–8.3)
WBC # BLD: 12.7 E9/L (ref 4.5–11.5)

## 2018-10-31 PROCEDURE — 99285 EMERGENCY DEPT VISIT HI MDM: CPT

## 2018-10-31 PROCEDURE — 83605 ASSAY OF LACTIC ACID: CPT

## 2018-10-31 PROCEDURE — 36415 COLL VENOUS BLD VENIPUNCTURE: CPT

## 2018-10-31 PROCEDURE — 36430 TRANSFUSION BLD/BLD COMPNT: CPT

## 2018-10-31 PROCEDURE — 93005 ELECTROCARDIOGRAM TRACING: CPT | Performed by: PHYSICIAN ASSISTANT

## 2018-10-31 PROCEDURE — 86850 RBC ANTIBODY SCREEN: CPT

## 2018-10-31 PROCEDURE — 85730 THROMBOPLASTIN TIME PARTIAL: CPT

## 2018-10-31 PROCEDURE — 85610 PROTHROMBIN TIME: CPT

## 2018-10-31 PROCEDURE — 86900 BLOOD TYPING SEROLOGIC ABO: CPT

## 2018-10-31 PROCEDURE — 86901 BLOOD TYPING SEROLOGIC RH(D): CPT

## 2018-10-31 PROCEDURE — 86923 COMPATIBILITY TEST ELECTRIC: CPT

## 2018-10-31 PROCEDURE — 85025 COMPLETE CBC W/AUTO DIFF WBC: CPT

## 2018-10-31 PROCEDURE — 71045 X-RAY EXAM CHEST 1 VIEW: CPT

## 2018-10-31 PROCEDURE — 80053 COMPREHEN METABOLIC PANEL: CPT

## 2018-10-31 PROCEDURE — P9016 RBC LEUKOCYTES REDUCED: HCPCS

## 2018-10-31 RX ORDER — 0.9 % SODIUM CHLORIDE 0.9 %
250 INTRAVENOUS SOLUTION INTRAVENOUS ONCE
Status: DISCONTINUED | OUTPATIENT
Start: 2018-10-31 | End: 2018-10-31 | Stop reason: HOSPADM

## 2018-10-31 ASSESSMENT — ENCOUNTER SYMPTOMS
ABDOMINAL PAIN: 0
VOMITING: 0
WHEEZING: 0
SPUTUM PRODUCTION: 0
COUGH: 0
HEMOPTYSIS: 0
BLOOD IN STOOL: 0
BACK PAIN: 0
NAUSEA: 0
SHORTNESS OF BREATH: 1

## 2018-10-31 NOTE — ED PROVIDER NOTES
26 YOF with hx of anemia presents to ED with worsening JAMES. Had labs drawn yesterday - hgb 6.8 - sent in by PCP. Shortness of Breath   Severity:  Moderate  Onset quality:  Unable to specify  Timing:  Constant  Progression:  Worsening  Chronicity:  Recurrent  Context: activity    Context comment:  Anemia  Relieved by:  Nothing  Worsened by:  Exertion  Ineffective treatments:  None tried  Associated symptoms: no abdominal pain, no chest pain, no cough, no diaphoresis, no fever, no headaches, no hemoptysis, no rash, no sputum production, no vomiting and no wheezing        Review of Systems   Constitutional: Negative for chills, diaphoresis and fever. Respiratory: Positive for shortness of breath. Negative for cough, hemoptysis, sputum production and wheezing. Cardiovascular: Negative for chest pain. Gastrointestinal: Negative for abdominal pain, blood in stool, nausea and vomiting. Genitourinary: Negative for dysuria and frequency. Musculoskeletal: Negative for back pain. Skin: Negative for rash. Neurological: Positive for weakness and light-headedness. Negative for dizziness and headaches. All other systems reviewed and are negative. Physical Exam   Constitutional: She appears well-developed and well-nourished. HENT:   Head: Normocephalic and atraumatic. Eyes: Conjunctivae are normal.   Neck: Normal range of motion. Neck supple. Cardiovascular: Normal rate, regular rhythm and normal heart sounds. No murmur heard. Pulmonary/Chest: Effort normal and breath sounds normal. No respiratory distress. She has no wheezes. She has no rales. Abdominal: Soft. Bowel sounds are normal. There is no tenderness. There is no rebound and no guarding. Musculoskeletal: She exhibits no edema or tenderness. Neurological: She is alert. Skin: Skin is warm and dry. Nursing note and vitals reviewed.       Procedures  --------------------------------------------- PAST HISTORY - 5.0 mmol/L    Chloride 102 98 - 107 mmol/L    CO2 23 22 - 29 mmol/L    Anion Gap 13 7 - 16 mmol/L    Glucose 94 74 - 109 mg/dL    BUN 11 6 - 20 mg/dL    CREATININE 0.7 0.5 - 1.0 mg/dL    GFR Non-African American >60 >=60 mL/min/1.73    GFR African American >60     Calcium 9.2 8.6 - 10.2 mg/dL    Total Protein 7.3 6.4 - 8.3 g/dL    Alb 4.2 3.5 - 5.2 g/dL    Total Bilirubin 0.3 0.0 - 1.2 mg/dL    Alkaline Phosphatase 48 35 - 104 U/L    ALT 11 0 - 32 U/L    AST 15 0 - 31 U/L   Lactic Acid, Plasma   Result Value Ref Range    Lactic Acid 1.2 0.5 - 2.2 mmol/L   Protime-INR   Result Value Ref Range    Protime 13.1 (H) 9.3 - 12.4 sec    INR 1.2    APTT   Result Value Ref Range    aPTT 30.5 24.5 - 35.1 sec   EKG 12 Lead   Result Value Ref Range    Ventricular Rate 98 BPM    Atrial Rate 98 BPM    P-R Interval 160 ms    QRS Duration 90 ms    Q-T Interval 364 ms    QTc Calculation (Bazett) 464 ms    P Axis 38 degrees    R Axis 42 degrees    T Axis 45 degrees   TYPE AND SCREEN   Result Value Ref Range    ABO/Rh AB POS     Antibody Screen NEG    PREPARE RBC (CROSSMATCH) Number of Units: 2, 2 Units   Result Value Ref Range    Product Code Blood Bank A0278I80     Description Blood Bank Red Blood Cells, Leuko-reduced     Unit Number [id number]     Dispense Status Blood Bank selected     Product Code Blood Bank L0761N09     Description Blood Bank Red Blood Cells, Leuko-reduced     Unit Number [id number]     Dispense Status Blood Bank selected     Product Code Blood Bank K2337F29     Description Blood Bank Red Blood Cells, Leuko-reduced     Unit Number [id number]     Dispense Status Blood Bank released     Product Code Blood Bank [id number]     Description Blood Bank Red Blood Cells, Leuko-reduced     Unit Number [id number]     Dispense Status Blood Bank transfused        Radiology:  XR CHEST PORTABLE   Final Result      1.  No acute pleuroparenchymal disease.                      ------------------------- NURSING NOTES AND VITALS REVIEWED ---------------------------  Date / Time Roomed:  10/31/2018 11:36 AM  ED Bed Assignment:  17A/17A-17    The nursing notes within the ED encounter and vital signs as below have been reviewed. /80   Pulse 84   Temp 97.7 °F (36.5 °C)   Resp 18   Ht 5' 7\" (1.702 m)   Wt 274 lb (124.3 kg)   SpO2 99%   BMI 42.91 kg/m²   Oxygen Saturation Interpretation: Normal      ------------------------------------------ PROGRESS NOTES ------------------------------------------  12:19 PM  I have spoken with the patient and discussed todays results, in addition to providing specific details for the plan of care and counseling regarding the diagnosis and prognosis. Their questions are answered at this time and they are agreeable with the plan. I discussed at length with them reasons for immediate return here for re evaluation. They will followup with their OBGYN and primary care physician by calling their office tomorrow. --------------------------------- ADDITIONAL PROVIDER NOTES ---------------------------------  At this time the patient is without objective evidence of an acute process requiring hospitalization or inpatient management. They have remained hemodynamically stable throughout their entire ED visit and are stable for discharge with outpatient follow-up. The plan has been discussed in detail and they are aware of the specific conditions for emergent return, as well as the importance of follow-up. Discharge Medication List as of 10/31/2018  3:13 PM          Diagnosis:  1. Chronic blood loss anemia    2. DUB (dysfunctional uterine bleeding)        Disposition:  Patient's disposition: Discharge to home  Patient's condition is stable. [facility]    ED Course as of Nov 01 1224   Thu Nov 01, 2018   1222 Pt was transfused one unit of blood with improvement in symptoms. Pt has chronic anemia from DUB. Pt will most likely need another ablation vs total hysterectomy to resolve this issue. Pt expressed understanding. Instructed to FU with OBGYN tomorrow to discuss. Given warning signs to return to ED.   [BM]      ED Course User Index  [BM] Yimi Vogel, DO Yimi Vogel,   Resident  11/01/18 1228

## 2018-11-02 LAB
EKG ATRIAL RATE: 98 BPM
EKG P AXIS: 38 DEGREES
EKG P-R INTERVAL: 160 MS
EKG Q-T INTERVAL: 364 MS
EKG QRS DURATION: 90 MS
EKG QTC CALCULATION (BAZETT): 464 MS
EKG R AXIS: 42 DEGREES
EKG T AXIS: 45 DEGREES
EKG VENTRICULAR RATE: 98 BPM

## 2018-11-04 LAB
BLOOD BANK DISPENSE STATUS: NORMAL
BLOOD BANK PRODUCT CODE: NORMAL
BPU ID: NORMAL
DESCRIPTION BLOOD BANK: NORMAL

## 2019-05-31 ENCOUNTER — APPOINTMENT (OUTPATIENT)
Dept: CT IMAGING | Age: 34
End: 2019-05-31
Payer: COMMERCIAL

## 2019-05-31 ENCOUNTER — HOSPITAL ENCOUNTER (EMERGENCY)
Age: 34
Discharge: HOME | End: 2019-06-01
Attending: EMERGENCY MEDICINE
Payer: COMMERCIAL

## 2019-05-31 VITALS
BODY MASS INDEX: 32.65 KG/M2 | HEART RATE: 75 BPM | WEIGHT: 208 LBS | DIASTOLIC BLOOD PRESSURE: 88 MMHG | TEMPERATURE: 98.8 F | HEIGHT: 67 IN | SYSTOLIC BLOOD PRESSURE: 130 MMHG | OXYGEN SATURATION: 100 % | RESPIRATION RATE: 20 BRPM

## 2019-05-31 DIAGNOSIS — M54.6 ACUTE RIGHT-SIDED THORACIC BACK PAIN: Primary | ICD-10-CM

## 2019-05-31 LAB
BACTERIA: ABNORMAL /HPF
BILIRUBIN URINE: NEGATIVE
BLOOD, URINE: ABNORMAL
CLARITY: CLEAR
COLOR: YELLOW
CRYSTALS, UA: ABNORMAL
EPITHELIAL CELLS, UA: ABNORMAL /HPF
GLUCOSE URINE: NEGATIVE MG/DL
HCG(URINE) PREGNANCY TEST: NEGATIVE
KETONES, URINE: NEGATIVE MG/DL
LEUKOCYTE ESTERASE, URINE: NEGATIVE
NITRITE, URINE: NEGATIVE
PH UA: 5 (ref 5–9)
PROTEIN UA: NEGATIVE MG/DL
RBC UA: ABNORMAL /HPF (ref 0–2)
SPECIFIC GRAVITY UA: 1.02 (ref 1–1.03)
UROBILINOGEN, URINE: 0.2 E.U./DL
WBC UA: ABNORMAL /HPF (ref 0–5)

## 2019-05-31 PROCEDURE — 81001 URINALYSIS AUTO W/SCOPE: CPT

## 2019-05-31 PROCEDURE — 6360000002 HC RX W HCPCS: Performed by: EMERGENCY MEDICINE

## 2019-05-31 PROCEDURE — 96374 THER/PROPH/DIAG INJ IV PUSH: CPT

## 2019-05-31 PROCEDURE — 96372 THER/PROPH/DIAG INJ SC/IM: CPT

## 2019-05-31 PROCEDURE — 99284 EMERGENCY DEPT VISIT MOD MDM: CPT

## 2019-05-31 PROCEDURE — 6370000000 HC RX 637 (ALT 250 FOR IP)

## 2019-05-31 PROCEDURE — 2580000003 HC RX 258: Performed by: EMERGENCY MEDICINE

## 2019-05-31 PROCEDURE — 96375 TX/PRO/DX INJ NEW DRUG ADDON: CPT

## 2019-05-31 PROCEDURE — 6370000000 HC RX 637 (ALT 250 FOR IP): Performed by: EMERGENCY MEDICINE

## 2019-05-31 PROCEDURE — 81025 URINE PREGNANCY TEST: CPT

## 2019-05-31 PROCEDURE — 74176 CT ABD & PELVIS W/O CONTRAST: CPT

## 2019-05-31 RX ORDER — KETOROLAC TROMETHAMINE 30 MG/ML
30 INJECTION, SOLUTION INTRAMUSCULAR; INTRAVENOUS ONCE
Status: COMPLETED | OUTPATIENT
Start: 2019-05-31 | End: 2019-05-31

## 2019-05-31 RX ORDER — TAMSULOSIN HYDROCHLORIDE 0.4 MG/1
CAPSULE ORAL
Status: COMPLETED
Start: 2019-05-31 | End: 2019-05-31

## 2019-05-31 RX ORDER — ONDANSETRON 2 MG/ML
4 INJECTION INTRAMUSCULAR; INTRAVENOUS ONCE
Status: COMPLETED | OUTPATIENT
Start: 2019-05-31 | End: 2019-05-31

## 2019-05-31 RX ORDER — 0.9 % SODIUM CHLORIDE 0.9 %
1000 INTRAVENOUS SOLUTION INTRAVENOUS ONCE
Status: COMPLETED | OUTPATIENT
Start: 2019-05-31 | End: 2019-06-01

## 2019-05-31 RX ORDER — TAMSULOSIN HYDROCHLORIDE 0.4 MG/1
0.4 CAPSULE ORAL DAILY
Status: DISCONTINUED | OUTPATIENT
Start: 2019-06-01 | End: 2019-06-01 | Stop reason: HOSPADM

## 2019-05-31 RX ORDER — KETOROLAC TROMETHAMINE 30 MG/ML
60 INJECTION, SOLUTION INTRAMUSCULAR; INTRAVENOUS ONCE
Status: COMPLETED | OUTPATIENT
Start: 2019-05-31 | End: 2019-05-31

## 2019-05-31 RX ORDER — ONDANSETRON 4 MG/1
4 TABLET, ORALLY DISINTEGRATING ORAL ONCE
Status: COMPLETED | OUTPATIENT
Start: 2019-05-31 | End: 2019-05-31

## 2019-05-31 RX ADMIN — KETOROLAC TROMETHAMINE 60 MG: 30 INJECTION, SOLUTION INTRAMUSCULAR at 22:34

## 2019-05-31 RX ADMIN — ONDANSETRON 4 MG: 2 INJECTION INTRAMUSCULAR; INTRAVENOUS at 23:05

## 2019-05-31 RX ADMIN — SODIUM CHLORIDE 1000 ML: 9 INJECTION, SOLUTION INTRAVENOUS at 23:05

## 2019-05-31 RX ADMIN — ONDANSETRON 4 MG: 4 TABLET, ORALLY DISINTEGRATING ORAL at 22:33

## 2019-05-31 RX ADMIN — KETOROLAC TROMETHAMINE 30 MG: 30 INJECTION, SOLUTION INTRAMUSCULAR; INTRAVENOUS at 23:05

## 2019-05-31 RX ADMIN — TAMSULOSIN HYDROCHLORIDE 0.4 MG: 0.4 CAPSULE ORAL at 23:05

## 2019-05-31 ASSESSMENT — PAIN DESCRIPTION - ORIENTATION: ORIENTATION: RIGHT

## 2019-05-31 ASSESSMENT — PAIN DESCRIPTION - DESCRIPTORS: DESCRIPTORS: SHARP

## 2019-05-31 ASSESSMENT — PAIN DESCRIPTION - PAIN TYPE: TYPE: ACUTE PAIN

## 2019-05-31 ASSESSMENT — PAIN SCALES - GENERAL
PAINLEVEL_OUTOF10: 10
PAINLEVEL_OUTOF10: 8
PAINLEVEL_OUTOF10: 8
PAINLEVEL_OUTOF10: 10

## 2019-05-31 ASSESSMENT — PAIN DESCRIPTION - LOCATION: LOCATION: BACK

## 2019-06-01 PROCEDURE — 96375 TX/PRO/DX INJ NEW DRUG ADDON: CPT

## 2019-06-01 PROCEDURE — 6360000002 HC RX W HCPCS: Performed by: EMERGENCY MEDICINE

## 2019-06-01 RX ORDER — MORPHINE SULFATE 4 MG/ML
4 INJECTION, SOLUTION INTRAMUSCULAR; INTRAVENOUS ONCE
Status: COMPLETED | OUTPATIENT
Start: 2019-06-01 | End: 2019-06-01

## 2019-06-01 RX ORDER — CYCLOBENZAPRINE HCL 5 MG
5 TABLET ORAL 3 TIMES DAILY PRN
Qty: 30 TABLET | Refills: 0 | Status: SHIPPED | OUTPATIENT
Start: 2019-06-01 | End: 2019-06-11

## 2019-06-01 RX ADMIN — MORPHINE SULFATE 4 MG: 4 INJECTION INTRAVENOUS at 00:12

## 2019-06-01 ASSESSMENT — PAIN SCALES - GENERAL
PAINLEVEL_OUTOF10: 8
PAINLEVEL_OUTOF10: 4

## 2019-06-01 NOTE — ED PROVIDER NOTES
HPI:   Sandro Valdes is a 26 y.o. female presenting to the ED for right flank pain, beginning today. The complaint has been constant, severe in severity, and worsened by nothing. Patient also presents with urinary frequency. Patient has a hx of ovarian cysts. Patient denies vaginal bleeding, hematuria, dysuria, vaginal discharge, nausea, emesis, dizziness, fever, chills, abdominal pain, CP, SOB, or any other pertinent complaints. ROS:   Pertinent positives and negatives are stated within HPI, all other systems reviewed and are negative.    --------------------------------------------- PAST HISTORY ---------------------------------------------  Past Medical History:  has a past medical history of Anemia, GI (gastrointestinal bleed), and Ovarian cyst.    Past Surgical History:  has a past surgical history that includes Endoscopy, colon, diagnostic (02/01/2018); cyst removal (Left); Colonoscopy; and pr hysteroscopy,w/endometrial ablation (N/A, 7/18/2018). Social History:  reports that she has been smoking cigarettes. She has a 10.00 pack-year smoking history. She has never used smokeless tobacco. She reports that she does not drink alcohol or use drugs. Family History: family history is not on file. The patients home medications have been reviewed.     Allergies: Sulfa antibiotics    -------------------------------------------------- RESULTS -------------------------------------------------  All laboratory and radiology results have been personally reviewed by myself   LABS:  Results for orders placed or performed during the hospital encounter of 05/31/19   Urinalysis   Result Value Ref Range    Color, UA Yellow Straw/Yellow    Clarity, UA Clear Clear    Glucose, Ur Negative Negative mg/dL    Bilirubin Urine Negative Negative    Ketones, Urine Negative Negative mg/dL    Specific Gravity, UA 1.020 1.005 - 1.030    Blood, Urine LARGE (A) Negative    pH, UA 5.0 5.0 - 9.0    Protein, UA Negative Negative mg/dL    Urobilinogen, Urine 0.2 <2.0 E.U./dL    Nitrite, Urine Negative Negative    Leukocyte Esterase, Urine Negative Negative   Pregnancy, urine   Result Value Ref Range    HCG(Urine) Pregnancy Test NEGATIVE NEGATIVE   Microscopic Urinalysis   Result Value Ref Range    WBC, UA NONE 0 - 5 /HPF    RBC, UA 10-20 (A) 0 - 2 /HPF    Epi Cells FEW /HPF    Bacteria, UA RARE (A) /HPF    Crystals Few        RADIOLOGY:  Interpreted by Radiologist.  No orders to display       ------------------------- NURSING NOTES AND VITALS REVIEWED ---------------------------   The nursing notes within the ED encounter and vital signs as below have been reviewed. /88   Pulse 75   Temp 98.8 °F (37.1 °C) (Oral)   Resp 20   Ht 5' 7\" (1.702 m)   Wt 208 lb (94.3 kg)   SpO2 100%   BMI 32.58 kg/m²   Oxygen Saturation Interpretation: Normal    ---------------------------------------------------PHYSICAL EXAM--------------------------------------    Constitutional/General: Alert and oriented x3, well appearing, non toxic in NAD  Head: NC/AT  Eyes: PERRL, EOMI  Mouth: Oropharynx clear, handling secretions, no trismus  Neck: Supple, full ROM,   Pulmonary: Lungs clear to auscultation bilaterally, no wheezes, rales, or rhonchi. Not in respiratory distress  Cardiovascular:  Regular rate and rhythm, no murmurs, gallops, or rubs. 2+ distal pulses  Abdomen: Soft, non tender, non distended,   Extremities: Moves all extremities x 4. Warm and well perfused  Skin: warm and dry without rash  Neurologic: GCS 15,  Psych: Normal Affect      ------------------------------ ED COURSE/MEDICAL DECISION MAKING----------------------  Medications   ketorolac (TORADOL) injection 60 mg (has no administration in time range)   ondansetron (ZOFRAN-ODT) disintegrating tablet 4 mg (has no administration in time range)       Medical Decision Making:      Patient presents to the ED for right flank pain and urinary frequency. Patient examined.  Ordered and obtained

## 2020-10-12 ENCOUNTER — APPOINTMENT (OUTPATIENT)
Dept: GENERAL RADIOLOGY | Age: 35
End: 2020-10-12
Payer: COMMERCIAL

## 2020-10-12 ENCOUNTER — HOSPITAL ENCOUNTER (OUTPATIENT)
Age: 35
Setting detail: OBSERVATION
Discharge: HOME | End: 2020-10-14
Attending: EMERGENCY MEDICINE | Admitting: INTERNAL MEDICINE
Payer: COMMERCIAL

## 2020-10-12 LAB
ABO/RH: NORMAL
ALBUMIN SERPL-MCNC: 3.7 G/DL (ref 3.5–5.2)
ALP BLD-CCNC: 48 U/L (ref 35–104)
ALT SERPL-CCNC: 8 U/L (ref 0–32)
ANION GAP SERPL CALCULATED.3IONS-SCNC: 6 MMOL/L (ref 7–16)
ANTIBODY SCREEN: NORMAL
APTT: 31.3 SEC (ref 24.5–35.1)
AST SERPL-CCNC: 11 U/L (ref 0–31)
BACTERIA: ABNORMAL /HPF
BASOPHILS ABSOLUTE: 0.02 E9/L (ref 0–0.2)
BASOPHILS RELATIVE PERCENT: 0.2 % (ref 0–2)
BILIRUB SERPL-MCNC: <0.2 MG/DL (ref 0–1.2)
BILIRUBIN URINE: NEGATIVE
BLOOD, URINE: ABNORMAL
BUN BLDV-MCNC: 15 MG/DL (ref 6–20)
CALCIUM SERPL-MCNC: 8.8 MG/DL (ref 8.6–10.2)
CHLORIDE BLD-SCNC: 106 MMOL/L (ref 98–107)
CLARITY: ABNORMAL
CO2: 25 MMOL/L (ref 22–29)
COLOR: YELLOW
CREAT SERPL-MCNC: 0.8 MG/DL (ref 0.5–1)
EOSINOPHILS ABSOLUTE: 0.14 E9/L (ref 0.05–0.5)
EOSINOPHILS RELATIVE PERCENT: 1.6 % (ref 0–6)
EPITHELIAL CELLS, UA: ABNORMAL /HPF
GFR AFRICAN AMERICAN: >60
GFR NON-AFRICAN AMERICAN: >60 ML/MIN/1.73
GLUCOSE BLD-MCNC: 94 MG/DL (ref 74–99)
GLUCOSE URINE: NEGATIVE MG/DL
HCG, URINE, POC: NEGATIVE
HCT VFR BLD CALC: 24.1 % (ref 34–48)
HEMOGLOBIN: 7.2 G/DL (ref 11.5–15.5)
IMMATURE GRANULOCYTES #: 0.03 E9/L
IMMATURE GRANULOCYTES %: 0.3 % (ref 0–5)
INR BLD: 1
KETONES, URINE: 15 MG/DL
LACTIC ACID: 0.8 MMOL/L (ref 0.5–2.2)
LEUKOCYTE ESTERASE, URINE: NEGATIVE
LIPASE: 25 U/L (ref 13–60)
LYMPHOCYTES ABSOLUTE: 2.21 E9/L (ref 1.5–4)
LYMPHOCYTES RELATIVE PERCENT: 25.2 % (ref 20–42)
Lab: NORMAL
MCH RBC QN AUTO: 22.4 PG (ref 26–35)
MCHC RBC AUTO-ENTMCNC: 29.9 % (ref 32–34.5)
MCV RBC AUTO: 74.8 FL (ref 80–99.9)
MONOCYTES ABSOLUTE: 0.53 E9/L (ref 0.1–0.95)
MONOCYTES RELATIVE PERCENT: 6 % (ref 2–12)
NEGATIVE QC PASS/FAIL: NORMAL
NEUTROPHILS ABSOLUTE: 5.84 E9/L (ref 1.8–7.3)
NEUTROPHILS RELATIVE PERCENT: 66.7 % (ref 43–80)
NITRITE, URINE: NEGATIVE
PDW BLD-RTO: 18.6 FL (ref 11.5–15)
PH UA: 5.5 (ref 5–9)
PLATELET # BLD: 392 E9/L (ref 130–450)
PMV BLD AUTO: 11.1 FL (ref 7–12)
POSITIVE QC PASS/FAIL: NORMAL
POTASSIUM SERPL-SCNC: 4.1 MMOL/L (ref 3.5–5)
PROTEIN UA: ABNORMAL MG/DL
PROTHROMBIN TIME: 11.5 SEC (ref 9.3–12.4)
RBC # BLD: 3.22 E12/L (ref 3.5–5.5)
RBC UA: >20 /HPF (ref 0–2)
SODIUM BLD-SCNC: 137 MMOL/L (ref 132–146)
SPECIFIC GRAVITY UA: >=1.03 (ref 1–1.03)
TOTAL PROTEIN: 6.4 G/DL (ref 6.4–8.3)
TROPONIN: <0.01 NG/ML (ref 0–0.03)
UROBILINOGEN, URINE: 0.2 E.U./DL
WBC # BLD: 8.8 E9/L (ref 4.5–11.5)
WBC UA: ABNORMAL /HPF (ref 0–5)

## 2020-10-12 PROCEDURE — 86901 BLOOD TYPING SEROLOGIC RH(D): CPT

## 2020-10-12 PROCEDURE — 85730 THROMBOPLASTIN TIME PARTIAL: CPT

## 2020-10-12 PROCEDURE — 81001 URINALYSIS AUTO W/SCOPE: CPT

## 2020-10-12 PROCEDURE — 86900 BLOOD TYPING SEROLOGIC ABO: CPT

## 2020-10-12 PROCEDURE — P9016 RBC LEUKOCYTES REDUCED: HCPCS

## 2020-10-12 PROCEDURE — 86850 RBC ANTIBODY SCREEN: CPT

## 2020-10-12 PROCEDURE — 85025 COMPLETE CBC W/AUTO DIFF WBC: CPT

## 2020-10-12 PROCEDURE — 83605 ASSAY OF LACTIC ACID: CPT

## 2020-10-12 PROCEDURE — 86923 COMPATIBILITY TEST ELECTRIC: CPT

## 2020-10-12 PROCEDURE — 80053 COMPREHEN METABOLIC PANEL: CPT

## 2020-10-12 PROCEDURE — 99284 EMERGENCY DEPT VISIT MOD MDM: CPT

## 2020-10-12 PROCEDURE — 83550 IRON BINDING TEST: CPT

## 2020-10-12 PROCEDURE — 74022 RADEX COMPL AQT ABD SERIES: CPT

## 2020-10-12 PROCEDURE — 83690 ASSAY OF LIPASE: CPT

## 2020-10-12 PROCEDURE — 83540 ASSAY OF IRON: CPT

## 2020-10-12 PROCEDURE — 85045 AUTOMATED RETICULOCYTE COUNT: CPT

## 2020-10-12 PROCEDURE — 84484 ASSAY OF TROPONIN QUANT: CPT

## 2020-10-12 PROCEDURE — 84466 ASSAY OF TRANSFERRIN: CPT

## 2020-10-12 PROCEDURE — 93005 ELECTROCARDIOGRAM TRACING: CPT | Performed by: EMERGENCY MEDICINE

## 2020-10-12 PROCEDURE — 82728 ASSAY OF FERRITIN: CPT

## 2020-10-12 PROCEDURE — 85610 PROTHROMBIN TIME: CPT

## 2020-10-12 PROCEDURE — 36415 COLL VENOUS BLD VENIPUNCTURE: CPT

## 2020-10-12 PROCEDURE — 99283 EMERGENCY DEPT VISIT LOW MDM: CPT

## 2020-10-12 ASSESSMENT — ENCOUNTER SYMPTOMS
SORE THROAT: 0
EYE REDNESS: 0
DIARRHEA: 0
EYE DISCHARGE: 0
NAUSEA: 0
SHORTNESS OF BREATH: 0
ANAL BLEEDING: 0
BLOOD IN STOOL: 0
ABDOMINAL PAIN: 1
BACK PAIN: 0
ABDOMINAL DISTENTION: 0
VOMITING: 0
COUGH: 0
EYE PAIN: 0
SINUS PRESSURE: 0
WHEEZING: 0

## 2020-10-12 ASSESSMENT — PAIN DESCRIPTION - ORIENTATION: ORIENTATION: MID

## 2020-10-12 ASSESSMENT — PAIN DESCRIPTION - FREQUENCY: FREQUENCY: INTERMITTENT

## 2020-10-12 ASSESSMENT — PAIN SCALES - GENERAL: PAINLEVEL_OUTOF10: 5

## 2020-10-12 ASSESSMENT — PAIN DESCRIPTION - PAIN TYPE: TYPE: ACUTE PAIN

## 2020-10-12 ASSESSMENT — PAIN DESCRIPTION - PROGRESSION: CLINICAL_PROGRESSION: NOT CHANGED

## 2020-10-12 ASSESSMENT — PAIN DESCRIPTION - DESCRIPTORS: DESCRIPTORS: BURNING;SHARP

## 2020-10-12 ASSESSMENT — PAIN DESCRIPTION - LOCATION: LOCATION: ABDOMEN

## 2020-10-13 ENCOUNTER — APPOINTMENT (OUTPATIENT)
Dept: ULTRASOUND IMAGING | Age: 35
End: 2020-10-13
Payer: COMMERCIAL

## 2020-10-13 PROBLEM — K92.2 GI BLEED: Status: ACTIVE | Noted: 2020-10-13

## 2020-10-13 LAB
BLOOD BANK DISPENSE STATUS: NORMAL
BLOOD BANK PRODUCT CODE: NORMAL
BPU ID: NORMAL
D DIMER: <200 NG/ML DDU
DESCRIPTION BLOOD BANK: NORMAL
EKG ATRIAL RATE: 81 BPM
EKG P AXIS: 55 DEGREES
EKG P-R INTERVAL: 160 MS
EKG Q-T INTERVAL: 374 MS
EKG QRS DURATION: 88 MS
EKG QTC CALCULATION (BAZETT): 434 MS
EKG R AXIS: 25 DEGREES
EKG T AXIS: 38 DEGREES
EKG VENTRICULAR RATE: 81 BPM
FERRITIN: 2 NG/ML
FOLATE: 6.2 NG/ML (ref 4.8–24.2)
HCT VFR BLD CALC: 25.6 % (ref 34–48)
HCT VFR BLD CALC: 25.7 % (ref 34–48)
HEMOGLOBIN: 7.6 G/DL (ref 11.5–15.5)
HEMOGLOBIN: 7.6 G/DL (ref 11.5–15.5)
IMMATURE RETIC FRACT: 23.4 % (ref 3–15.9)
IRON SATURATION: 5 % (ref 15–50)
IRON: 18 MCG/DL (ref 37–145)
PATHOLOGIST REVIEW: NORMAL
RETIC HGB EQUIVALENT: 19.9 PG (ref 28.2–36.6)
RETICULOCYTE ABSOLUTE COUNT: 0.07 E12/L
RETICULOCYTE COUNT PCT: 2 % (ref 0.4–1.9)
TOTAL IRON BINDING CAPACITY: 338 MCG/DL (ref 250–450)
TRANSFERRIN: 287 MG/DL (ref 200–360)
VITAMIN B-12: 559 PG/ML (ref 211–946)

## 2020-10-13 PROCEDURE — C9113 INJ PANTOPRAZOLE SODIUM, VIA: HCPCS | Performed by: INTERNAL MEDICINE

## 2020-10-13 PROCEDURE — 6360000002 HC RX W HCPCS: Performed by: EMERGENCY MEDICINE

## 2020-10-13 PROCEDURE — C9113 INJ PANTOPRAZOLE SODIUM, VIA: HCPCS | Performed by: EMERGENCY MEDICINE

## 2020-10-13 PROCEDURE — 36430 TRANSFUSION BLD/BLD COMPNT: CPT

## 2020-10-13 PROCEDURE — 6360000002 HC RX W HCPCS: Performed by: INTERNAL MEDICINE

## 2020-10-13 PROCEDURE — 6370000000 HC RX 637 (ALT 250 FOR IP): Performed by: OBSTETRICS & GYNECOLOGY

## 2020-10-13 PROCEDURE — 6370000000 HC RX 637 (ALT 250 FOR IP): Performed by: EMERGENCY MEDICINE

## 2020-10-13 PROCEDURE — G0378 HOSPITAL OBSERVATION PER HR: HCPCS

## 2020-10-13 PROCEDURE — 93010 ELECTROCARDIOGRAM REPORT: CPT | Performed by: INTERNAL MEDICINE

## 2020-10-13 PROCEDURE — 96376 TX/PRO/DX INJ SAME DRUG ADON: CPT

## 2020-10-13 PROCEDURE — 2580000003 HC RX 258: Performed by: EMERGENCY MEDICINE

## 2020-10-13 PROCEDURE — 85018 HEMOGLOBIN: CPT

## 2020-10-13 PROCEDURE — 82746 ASSAY OF FOLIC ACID SERUM: CPT

## 2020-10-13 PROCEDURE — 2580000003 HC RX 258: Performed by: INTERNAL MEDICINE

## 2020-10-13 PROCEDURE — 76830 TRANSVAGINAL US NON-OB: CPT

## 2020-10-13 PROCEDURE — 36415 COLL VENOUS BLD VENIPUNCTURE: CPT

## 2020-10-13 PROCEDURE — 85378 FIBRIN DEGRADE SEMIQUANT: CPT

## 2020-10-13 PROCEDURE — 85014 HEMATOCRIT: CPT

## 2020-10-13 PROCEDURE — 6370000000 HC RX 637 (ALT 250 FOR IP): Performed by: INTERNAL MEDICINE

## 2020-10-13 PROCEDURE — 96374 THER/PROPH/DIAG INJ IV PUSH: CPT

## 2020-10-13 PROCEDURE — 82607 VITAMIN B-12: CPT

## 2020-10-13 RX ORDER — ACETAMINOPHEN 325 MG/1
650 TABLET ORAL EVERY 4 HOURS PRN
Status: DISCONTINUED | OUTPATIENT
Start: 2020-10-13 | End: 2020-10-14 | Stop reason: HOSPADM

## 2020-10-13 RX ORDER — 0.9 % SODIUM CHLORIDE 0.9 %
20 INTRAVENOUS SOLUTION INTRAVENOUS ONCE
Status: COMPLETED | OUTPATIENT
Start: 2020-10-13 | End: 2020-10-13

## 2020-10-13 RX ORDER — FERROUS SULFATE 325(65) MG
325 TABLET ORAL
Status: DISCONTINUED | OUTPATIENT
Start: 2020-10-13 | End: 2020-10-14 | Stop reason: HOSPADM

## 2020-10-13 RX ORDER — MEDROXYPROGESTERONE ACETATE 5 MG/1
10 TABLET ORAL DAILY
Status: DISCONTINUED | OUTPATIENT
Start: 2020-10-13 | End: 2020-10-14 | Stop reason: HOSPADM

## 2020-10-13 RX ORDER — ONDANSETRON 2 MG/ML
4 INJECTION INTRAMUSCULAR; INTRAVENOUS EVERY 6 HOURS PRN
Status: DISCONTINUED | OUTPATIENT
Start: 2020-10-13 | End: 2020-10-14 | Stop reason: HOSPADM

## 2020-10-13 RX ORDER — PANTOPRAZOLE SODIUM 40 MG/10ML
40 INJECTION, POWDER, LYOPHILIZED, FOR SOLUTION INTRAVENOUS 2 TIMES DAILY
Status: DISCONTINUED | OUTPATIENT
Start: 2020-10-13 | End: 2020-10-14 | Stop reason: HOSPADM

## 2020-10-13 RX ORDER — PANTOPRAZOLE SODIUM 40 MG/10ML
40 INJECTION, POWDER, LYOPHILIZED, FOR SOLUTION INTRAVENOUS ONCE
Status: COMPLETED | OUTPATIENT
Start: 2020-10-13 | End: 2020-10-13

## 2020-10-13 RX ORDER — SODIUM CHLORIDE 0.9 % (FLUSH) 0.9 %
10 SYRINGE (ML) INJECTION EVERY 12 HOURS SCHEDULED
Status: DISCONTINUED | OUTPATIENT
Start: 2020-10-13 | End: 2020-10-14 | Stop reason: HOSPADM

## 2020-10-13 RX ORDER — SODIUM CHLORIDE 0.9 % (FLUSH) 0.9 %
10 SYRINGE (ML) INJECTION PRN
Status: DISCONTINUED | OUTPATIENT
Start: 2020-10-13 | End: 2020-10-14 | Stop reason: HOSPADM

## 2020-10-13 RX ADMIN — LIDOCAINE HYDROCHLORIDE: 20 SOLUTION ORAL; TOPICAL at 00:41

## 2020-10-13 RX ADMIN — FERROUS SULFATE TAB 325 MG (65 MG ELEMENTAL FE) 325 MG: 325 (65 FE) TAB at 16:36

## 2020-10-13 RX ADMIN — PANTOPRAZOLE SODIUM 40 MG: 40 INJECTION, POWDER, FOR SOLUTION INTRAVENOUS at 10:36

## 2020-10-13 RX ADMIN — SODIUM CHLORIDE 20 ML: 9 INJECTION, SOLUTION INTRAVENOUS at 00:50

## 2020-10-13 RX ADMIN — FERROUS SULFATE TAB 325 MG (65 MG ELEMENTAL FE) 325 MG: 325 (65 FE) TAB at 10:37

## 2020-10-13 RX ADMIN — SODIUM CHLORIDE, PRESERVATIVE FREE 10 ML: 5 INJECTION INTRAVENOUS at 10:36

## 2020-10-13 RX ADMIN — MEDROXYPROGESTERONE ACETATE 10 MG: 5 TABLET ORAL at 18:54

## 2020-10-13 RX ADMIN — PANTOPRAZOLE SODIUM 40 MG: 40 INJECTION, POWDER, FOR SOLUTION INTRAVENOUS at 00:50

## 2020-10-13 RX ADMIN — SODIUM CHLORIDE, PRESERVATIVE FREE 10 ML: 5 INJECTION INTRAVENOUS at 20:00

## 2020-10-13 RX ADMIN — PANTOPRAZOLE SODIUM 40 MG: 40 INJECTION, POWDER, FOR SOLUTION INTRAVENOUS at 20:00

## 2020-10-13 ASSESSMENT — PAIN DESCRIPTION - PROGRESSION: CLINICAL_PROGRESSION: NOT CHANGED

## 2020-10-13 ASSESSMENT — PAIN DESCRIPTION - LOCATION: LOCATION: ABDOMEN

## 2020-10-13 ASSESSMENT — PAIN SCALES - GENERAL: PAINLEVEL_OUTOF10: 4

## 2020-10-13 ASSESSMENT — PAIN DESCRIPTION - PAIN TYPE: TYPE: ACUTE PAIN

## 2020-10-13 ASSESSMENT — PAIN DESCRIPTION - ORIENTATION: ORIENTATION: MID

## 2020-10-13 ASSESSMENT — PAIN DESCRIPTION - DESCRIPTORS: DESCRIPTORS: PRESSURE

## 2020-10-13 NOTE — ED NOTES
Pt. C/o abdominal pain for the past 5 days. Pt. also c/o intermittent vaginal bleeding for several weeks. States they were sent in for low Hgb by their doctor.      Felipe Mead RN  10/12/20 9933

## 2020-10-13 NOTE — H&P
Department of Internal Medicine  History and Physical    PCP: Dr. Arturo Hanna   Admitting Physician: Dr. Ramonita Chin  Consultants: Dr. Geraldine Abrams, Dr. Giron Malina:  SOB    HISTORY OF PRESENT ILLNESS:    Mercedez Hadley is a 78-year-old female who presented through [address] [place] [address] emergency department for the evaluation of profound shortness of breath along with dysfunctional uterine bleeding. She was found to be anemic in the emergency department and was transfused with 1 unit of packed red blood cells. She has a known history of peptic ulcer disease and follows with the GI team locally. She avoids nonsteroidal anti-inflammatories and has no other medications directly resulting in GI bleed. She does have epigastric abdominal discomfort and with her shortness of breath she presented to the hospital with concern of peptic ulcer disease. More concerning, the patient has been vaginally bleeding for greater than 3 weeks. She has a previous history of dysfunctional uterine bleeding requiring endometrial ablation in the past.  She had a miscarriage recently as well. It was determined she would be admitted to the hospital for evaluation by both the GI and gynecologic teams. She is feeling better today with less shortness of breath. She denies melanotic stool or bright red rectal bleeding. She continues to have vaginal bleeding. PAST MEDICAL Hx:  Past Medical History:   Diagnosis Date    Anemia     GI (gastrointestinal bleed)     History of blood transfusion     Ovarian cyst        PAST SURGICAL Hx:   Past Surgical History:   Procedure Laterality Date    COLONOSCOPY      CYST REMOVAL Left     wrist    ENDOSCOPY, COLON, DIAGNOSTIC  02/01/2018    [place] HYSTEROSCOPY,W/ENDOMETRIAL ABLATION N/A 7/18/2018    HYSTEROSCOPY, DILATATION AND CURETTAGE performed by Dona Quintana MD at [address] Hx:  History reviewed. No pertinent family history.     HOME MEDICATIONS:  Prior to Admission medications    Medication Sig Start Date End Date Taking?  Authorizing Provider   Pantoprazole Sodium (PROTONIX PO) Take 40 mg by mouth daily Unknown dose    Yes Historical Provider, MD   ferrous sulfate (TONY-DANNIE) 325 (65 Fe) MG tablet Take 1 tablet by mouth 3 times daily (with meals) 6/7/18   Jones Cui MD       ALLERGIES:  Sulfa antibiotics    SOCIAL Hx:  Social History     Socioeconomic History    Marital status: Single     Spouse name: Not on file    Number of children: Not on file    Years of education: Not on file    Highest education level: Not on file   Occupational History    Not on file   Social Needs    Financial resource strain: Not on file    Food insecurity     Worry: Not on file     Inability: Not on file    Transportation needs     Medical: Not on file     Non-medical: Not on file   Tobacco Use    Smoking status: Current Every Day Smoker     Packs/day: 1.00     Years: 10.00     Pack years: 10.00     Types: Cigarettes    Smokeless tobacco: Never Used   Substance and Sexual Activity    Alcohol use: No    Drug use: No    Sexual activity: Yes     Partners: Male   Lifestyle    Physical activity     Days per week: Not on file     Minutes per session: Not on file    Stress: Not on file   Relationships    Social connections     Talks on phone: Not on file     Gets together: Not on file     Attends Yarsani service: Not on file     Active member of club or organization: Not on file     Attends meetings of clubs or organizations: Not on file     Relationship status: Not on file    Intimate partner violence     Fear of current or ex partner: Not on file     Emotionally abused: Not on file     Physically abused: Not on file     Forced sexual activity: Not on file   Other Topics Concern    Not on file   Social History Narrative    Not on file       ROS:  General:   Denies chills, fatigue, fever, malaise, night sweats or weight loss    Psychological:   Denies anxiety, disorientation or hallucinations    ENT:    Denies epistaxis, headaches, vertigo or visual changes    Cardiovascular:   Denies any chest pain, irregular heartbeats, or palpitations. No paroxysmal nocturnal dyspnea. Respiratory:   Denies shortness of breath, coughing, sputum production, hemoptysis, or wheezing. No orthopnea. Gastrointestinal:   Admits to epigastric abdominal discomfort. Denies melanotic stool or bright red rectal bleeding. Genito-Urinary:    Admits to persistent vaginal bleeding over the past 3 weeks. Musculoskeletal:   Denies joint pain, joint stiffness, joint swelling or muscle pain    Neurology:    Denies any headache or focal neurological deficits. No weakness or paresthesia. Derm:    Denies any rashes, ulcers, or excoriations. Denies bruising. Extremities:   Denies any lower extremity swelling or edema. PHYSICAL EXAM:  VITALS:  Vitals:    10/13/20 0815   BP: (!) 109/58   Pulse: 68   Resp: 20   Temp: 98.1 °F (36.7 °C)   SpO2: 97%         CONSTITUTIONAL:    Awake, alert, cooperative, no apparent distress, and appears stated age    EYES:    PERRL, EOMI, sclera clear, conjunctiva normal    ENT:    Normocephalic, atraumatic, sinuses nontender on palpation. External ears without lesions. Oral pharynx with moist mucus membranes. Tonsils without erythema or exudates. NECK:    Supple, symmetrical, trachea midline, no adenopathy, thyroid symmetric, not enlarged and no tenderness, skin normal, no bruits, no JVD    HEMATOLOGIC/LYMPHATICS:    No cervical lymphadenopathy and no supraclavicular lymphadenopathy    LUNGS:    Symmetric. No increased work of breathing, good air exchange, clear to auscultation bilaterally, no wheezes, rhonchi, or rales,     CARDIOVASCULAR:    Normal apical impulse, regular rate and rhythm, normal S1 and S2, no S3 or S4, and no murmur noted    ABDOMEN:    Tenderness to palpation in the epigastric region with voluntary guarding elicited.     MUSCULOSKELETAL:    There is no redness, warmth, or swelling of the joints. Full range of motion noted. Motor strength is 5 out of 5 all extremities bilaterally. Tone is normal.    NEUROLOGIC:    Awake, alert, oriented to name, place and time. Cranial nerves II-XII are grossly intact. Motor is 5 out of 5 bilaterally. SKIN:    No bruising or bleeding. No redness, warmth, or swelling    EXTREMITIES:    Peripheral pulses present. No edema, cyanosis, or swelling. OSTEOPATHIC:    Examined in seated and supine positions. Normal thoracic kyphosis and lumbar lordosis. No acute somatic dysfunction. LABORATORY DATA:  CBC with Differential:    Lab Results   Component Value Date    WBC 8.8 10/12/2020    RBC 3.22 10/12/2020    HGB 7.2 10/12/2020    HCT 24.1 10/12/2020     10/12/2020    MCV 74.8 10/12/2020    MCH 22.4 10/12/2020    MCHC 29.9 10/12/2020    RDW 18.6 10/12/2020    SEGSPCT 86 06/14/2013    LYMPHOPCT 25.2 10/12/2020    MONOPCT 6.0 10/12/2020    BASOPCT 0.2 10/12/2020    MONOSABS 0.53 10/12/2020    LYMPHSABS 2.21 10/12/2020    EOSABS 0.14 10/12/2020    BASOSABS 0.02 10/12/2020     BMP:    Lab Results   Component Value Date     10/12/2020    K 4.1 10/12/2020     10/12/2020    CO2 25 10/12/2020    BUN 15 10/12/2020    LABALBU 3.7 10/12/2020    CREATININE 0.8 10/12/2020    CALCIUM 8.8 10/12/2020    GFRAA >60 10/12/2020    LABGLOM >60 10/12/2020    GLUCOSE 94 10/12/2020    GLUCOSE 92 02/11/2011       ASSESSMENT:  1. Acute blood loss anemia in the setting of dysfunctional uterine bleeding with the need to rule out concomitant GI bleeding with history of peptic ulcer disease and current epigastric discomfort    PLAN:  Both the GI and gynecologic teams will provide consultation. The patient has been placed on pulse dosed Protonix and I anticipate EGD evaluation this afternoon. As the patient's vaginal bleeding appears to be the primary issue, transvaginal ultrasound will be obtained and the gynecologic team will be asked to provide consultation. Hemoglobin is being monitored serially. We will await the recommendations from the above-mentioned subspecialists.     Yahaira Caballero D.O., [facility]  9:51 AM  10/13/2020    Electronically signed by Yahaira Caballero DO on 10/13/20 at 9:51 AM EDT

## 2020-10-13 NOTE — CONSULTS
[facility]  Dr. Karen Barnett M.D., Dr. West Alfonso M.D., AMPARO Garzon.O., Dr. Lita Lehman M.D., Dr. Ranjan Nina D.O. Patient Name: Maya Portillo  MRN: [id number]  : 1985 (38 y.o. female)  Allergies: is allergic to sulfa antibiotics. Date of Service: 10/13/2020       Reason for Consultation: Acute anemia; concern for GI source along with vaginal bleeding      HISTORY OF PRESENT ILLNESS:      The patient is a 38 y.o. female who presents with past medical history of chronic iron deficiency anemia, gastric ulceration 2018 ( multiple punctate ulcers in the antrum of the stomach ulcer with a pattern of NSAID use, biopsy negative for H. pylori), abnormal uterine bleeding 2018 (hysteroscopy and D&C, no submucous fibroid, appearance of hyperplasia throughout the endometrium, biopsy showing disordered proliferative phase endometrium likely secondary to unopposed estrogen effect or anovulatory cycles without epithelial hyperplasia). Patient mentions that she is known to be chronically anemic with hemoglobin level usually running around 9 g/dL, she does take iron supplements regularly and is not on any PPI. Patient presents with chief complaint of dyspnea on exertion for the past week. She also states that she has had persistent vaginal bleeding since  (2/3 weeks; persistent menstrual period without massive bleeding) and epigastric pain since 10/08/2020 Thursday. Patient does not give history of black tarry stool or bright red blood per rectum, no vomiting with blood or coffee-ground color. She does give a history of having heartburn with epigastric pain decreasing with food. Patient does give history of having a miscarriage back in 2020 but normal periods  with bleeding date lasting from 4 to 6 days. Per rectal examination was done in the ED with FOBT positive. HCG urine negative. Hemoglobin was 7.2 in the ED.   Patient reports hemoglobin to have been 8.3 on Friday and 7.4 yesterday in her PCPs office. Patient was also given 1 unit of PRBC. Gastroenterology was consulted for the acute anemia concerning for upper GI bleed along with vaginal bleeding. REVIEW OF SYSTEMS:   Constitutional: Denies fever, chills, or unintentional weight loss. HEENT: Denies double or blurry vision, headaches, ear pain or ringing in the ears. No drainage from the ears, nose or throat. Cardiovascular: Denies any chest pain, irregular heartbeats, or palpitations. Respiratory: Denies shortness of breath, coughing, sputum production, hemoptysis, or wheezing. Gastrointestinal: Denies nausea, vomiting, diarrhea, or constipation. Denies any abdominal pain, black or bloody stools. Genitourinary: Denies any urinary urgency, frequency, hematuria. Voiding without difficulty. Endocrine: Denies sensitivity to heat or cold. Denies changes in hair, skin or nails. Denies excessive thirst, hunger or going to the bathroom more frequently than usual.  Extremities: Denies swelling or calf pain. Musculoskeletal: Denies muscle or joint pain, stiffness, arthritis, gout, or instability. Dermatology / Skin: Denies any rashes, ulcers, or excoriations. Denies bruising. Neurology: Denies any bowel or bladder incontinence, headache or focal neurological deficits. No weakness or paresthesia. Denies numbness or tingling in the hands or feet. Psychiatric: Denies nervousness/anxiety, depression or memory loss.       Past Medical History:  Past Medical History:   Diagnosis Date    Anemia     GI (gastrointestinal bleed)     History of blood transfusion     Ovarian cyst        Past Surgical History:  Past Surgical History:   Procedure Laterality Date    COLONOSCOPY      CYST REMOVAL Left     wrist    ENDOSCOPY, COLON, DIAGNOSTIC  02/01/2018    [place] HYSTEROSCOPY,W/ENDOMETRIAL ABLATION N/A 7/18/2018    HYSTEROSCOPY, DILATATION AND CURETTAGE performed by Quinten Kevin MD at [address] Medications:  Prior to Admission medications    Medication Sig Start Date End Date Taking? Authorizing Provider   Pantoprazole Sodium (PROTONIX PO) Take 40 mg by mouth daily Unknown dose    Yes Historical Provider, MD   ferrous sulfate (TONY-DANNIE) 325 (65 Fe) MG tablet Take 1 tablet by mouth 3 times daily (with meals) 6/7/18   Venessa Mijares MD       Allergies: Sulfa antibiotics    Social History:  Social History     Socioeconomic History    Marital status: Single     Spouse name: Not on file    Number of children: Not on file    Years of education: Not on file    Highest education level: Not on file   Occupational History    Not on file   Social Needs    Financial resource strain: Not on file    Food insecurity     Worry: Not on file     Inability: Not on file    Transportation needs     Medical: Not on file     Non-medical: Not on file   Tobacco Use    Smoking status: Current Every Day Smoker     Packs/day: 1.00     Years: 10.00     Pack years: 10.00     Types: Cigarettes    Smokeless tobacco: Never Used   Substance and Sexual Activity    Alcohol use: No    Drug use: No    Sexual activity: Yes     Partners: Male   Lifestyle    Physical activity     Days per week: Not on file     Minutes per session: Not on file    Stress: Not on file   Relationships    Social connections     Talks on phone: Not on file     Gets together: Not on file     Attends Christianity service: Not on file     Active member of club or organization: Not on file     Attends meetings of clubs or organizations: Not on file     Relationship status: Not on file    Intimate partner violence     Fear of current or ex partner: Not on file     Emotionally abused: Not on file     Physically abused: Not on file     Forced sexual activity: Not on file   Other Topics Concern    Not on file   Social History Narrative    Not on file       Family History:  History reviewed. No pertinent family history.       PHYSICAL EXAM:  Vital Signs: BP (!) 109/58   Pulse 68   Temp 98.1 °F (36.7 °C) (Oral)   Resp 20   Ht 5' 7\" (1.702 m)   Wt 220 lb (99.8 kg)   LMP 09/24/2020   SpO2 97%   BMI 34.46 kg/m²   GENERAL APPEARANCE:  awake, alert, oriented, cooperative, and in no acute distress  EYES:  Lids and lashes normal, PERRLA, EOMI, sclera clear, conjunctiva normal  HENT:  Normocephalic, without obvious abnormality, atramatic, oral pharynx with moist mucus membranes, tonsils without erythema or exudates  NECK:  Supple with no carotid bruits, JVD or thyromegaly. No cervical adenopathy  LUNGS:  Clear to auscultation bilaterally with no wheezes, rales or rhonchi. No increased work of breathing, good air exchange. CARDIOVASCULAR: Regular rate and rhythm, no murmur  ABDOMEN: Epigastric tenderness without any rebound, normal bowel sounds in all 4 quadrants, soft, non-distended, no masses palpated, no hepatosplenomegally  MUSCULOSKELETAL:  There is no redness, warmth, or swelling of the joints. Full range of motion noted. Motor strength is 5 out of 5 all extremities bilaterally. Tone is normal.  EXTREMITIES: No edema, 2+ pulses bilaterally (radial and dorsalis pedis)  NEUROLOGIC:  Awake, alert, oriented to name, place and time. Cranial nerves II-XII are grossly intact. Motor is 5 out of 5 bilaterally. SKIN: Normal skin color, texture, and turgor. There is no redness, warmth, or swelling. No bruising or bleeding, no mottling. PSYCH: Affect, behavior and insight are all within normal limits.       DATA:  Results for orders placed or performed during the hospital encounter of 10/12/20   CBC auto differential   Result Value Ref Range    WBC 8.8 4.5 - 11.5 E9/L    RBC 3.22 (L) 3.50 - 5.50 E12/L    Hemoglobin 7.2 (L) 11.5 - 15.5 g/dL    Hematocrit 24.1 (L) 34.0 - 48.0 %    MCV 74.8 (L) 80.0 - 99.9 fL    MCH 22.4 (L) 26.0 - 35.0 pg    MCHC 29.9 (L) 32.0 - 34.5 %    RDW 18.6 (H) 11.5 - 15.0 fL    Platelets 020 259 - 208 E9/L    MPV 11.1 7.0 - 12.0 fL    Neutrophils % 66.7 43.0 - 80.0 %    Immature Granulocytes % 0.3 0.0 - 5.0 %    Lymphocytes % 25.2 20.0 - 42.0 %    Monocytes % 6.0 2.0 - 12.0 %    Eosinophils % 1.6 0.0 - 6.0 %    Basophils % 0.2 0.0 - 2.0 %    Neutrophils Absolute 5.84 1.80 - 7.30 E9/L    Immature Granulocytes # 0.03 E9/L    Lymphocytes Absolute 2.21 1.50 - 4.00 E9/L    Monocytes Absolute 0.53 0.10 - 0.95 E9/L    Eosinophils Absolute 0.14 0.05 - 0.50 E9/L    Basophils Absolute 0.02 0.00 - 0.20 E9/L   Comprehensive Metabolic Panel   Result Value Ref Range    Sodium 137 132 - 146 mmol/L    Potassium 4.1 3.5 - 5.0 mmol/L    Chloride 106 98 - 107 mmol/L    CO2 25 22 - 29 mmol/L    Anion Gap 6 (L) 7 - 16 mmol/L    Glucose 94 74 - 99 mg/dL    BUN 15 6 - 20 mg/dL    CREATININE 0.8 0.5 - 1.0 mg/dL    GFR Non-African American >60 >=60 mL/min/1.73    GFR African American >60     Calcium 8.8 8.6 - 10.2 mg/dL    Total Protein 6.4 6.4 - 8.3 g/dL    Alb 3.7 3.5 - 5.2 g/dL    Total Bilirubin <0.2 0.0 - 1.2 mg/dL    Alkaline Phosphatase 48 35 - 104 U/L    ALT 8 0 - 32 U/L    AST 11 0 - 31 U/L   Lipase   Result Value Ref Range    Lipase 25 13 - 60 U/L   Lactic Acid, Plasma   Result Value Ref Range    Lactic Acid 0.8 0.5 - 2.2 mmol/L   Troponin   Result Value Ref Range    Troponin <0.01 0.00 - 0.03 ng/mL   Protime-INR   Result Value Ref Range    Protime 11.5 9.3 - 12.4 sec    INR 1.0    APTT   Result Value Ref Range    aPTT 31.3 24.5 - 35.1 sec   Urinalysis   Result Value Ref Range    Color, UA Yellow Straw/Yellow    Clarity, UA CLOUDY (A) Clear    Glucose, Ur Negative Negative mg/dL    Bilirubin Urine Negative Negative    Ketones, Urine 15 (A) Negative mg/dL    Specific Gravity, UA >=1.030 1.005 - 1.030    Blood, Urine LARGE (A) Negative    pH, UA 5.5 5.0 - 9.0    Protein, UA TRACE Negative mg/dL    Urobilinogen, Urine 0.2 <2.0 E.U./dL    Nitrite, Urine Negative Negative    Leukocyte Esterase, Urine Negative Negative   Microscopic Urinalysis   Result Value Ref Range    WBC, UA 40mg BD  · Iron panel ordered as add on on the blood sample prior to transfusion  2. Dyspnea on exertion likely 2/2 anemia  · SaO2 on room air at 96-97%, no pleuritic chest pain, no LE edema or JVD elevation, unlikely PE or HF  3. Chronic iron deficiency anemia, on daily iron supplement  4. Hx of peptic ulcer disease, multiple antral ulcers  5. Hx of dysfunctional uterine bleeding 2018, s/p D&C 07/2018, negative for malignancy    PLAN:    · Continue pantoprazole 40mg IV BID  · To consider EGD  · Primary team to obtain PV US uterus  · Primary team to consult ob/gyn    Please see orders for further plan of care. Reviewed above with Dr. Jaz Lowery and Dr. Ambrocio Connor M.D., PGY-2  Internal Medicine Resident  10/13/2020 at 9:06 AM     Agree with above patient with acute on chronic anemia most likely secondary to dysfunctional uterine bleeding patient does have some epigastric pain with a need to rule upper GI bleed. Patient had breakfast today. Continue with clear liquids for today. Plan on EGD 10/14 for evaluation    Kumar Bolden DO   GI Fellow   10:34 AM    Pt seen and independently examined. Pertinent notes and lab work reviewed. D/w Dr. Quinten Jj. Agree with physical exam and A&P. Discussed with patient - all questions answered - agreeable with the plan as delineated. Thank you for the opportunity to see this patient in consultation.     Marcie De Los Santos MD  10/13/2020  1:06 PM

## 2020-10-13 NOTE — ED PROVIDER NOTES
Patient is a 39 y/o female who presents to the ED with abdominal pain. Patient states she has had epigastric pain for the past week. She states that she was seen by her PCP and her hemoglobin was 8.3. She followed up today and her hemoglobin was 7.1. She does report a history of bleeding ulcers. She also states that she has had vaginal bleeding for the past 2-3 weeks. Currently, her pain is 5/10. She denies any blood in the stool or black, tarry stool. Review of Systems   Constitutional: Negative for chills and fever. HENT: Negative for ear pain, sinus pressure and sore throat. Eyes: Negative for pain, discharge and redness. Respiratory: Negative for cough, shortness of breath and wheezing. Cardiovascular: Negative for chest pain. Gastrointestinal: Positive for abdominal pain. Negative for abdominal distention, anal bleeding, blood in stool, diarrhea, nausea and vomiting. Genitourinary: Positive for vaginal bleeding. Negative for dysuria and frequency. Musculoskeletal: Negative for arthralgias and back pain. Skin: Negative for rash and wound. Neurological: Negative for weakness and headaches. Hematological: Negative for adenopathy. All other systems reviewed and are negative. Physical Exam  Vitals signs and nursing note reviewed. Exam conducted with a chaperone present. Constitutional:       General: She is not in acute distress. Appearance: She is well-developed. HENT:      Head: Normocephalic and atraumatic. Mouth/Throat:      Mouth: Mucous membranes are moist.   Eyes:      Extraocular Movements: Extraocular movements intact. Pupils: Pupils are equal, round, and reactive to light. Cardiovascular:      Rate and Rhythm: Normal rate and regular rhythm. Heart sounds: No murmur. Pulmonary:      Effort: Pulmonary effort is normal. No respiratory distress. Breath sounds: Normal breath sounds. No stridor. No wheezing, rhonchi or rales.    Abdominal: General: Bowel sounds are increased. There is no distension. Palpations: Abdomen is soft. Tenderness: There is abdominal tenderness in the right lower quadrant and epigastric area. Genitourinary:     Rectum: Guaiac result positive. No mass, tenderness, anal fissure, external hemorrhoid or internal hemorrhoid. Skin:     General: Skin is warm and dry. Findings: No rash. Neurological:      Mental Status: She is alert and oriented to person, place, and time. Procedures     [facility]                --------------------------------------------- PAST HISTORY ---------------------------------------------  Past Medical History:  has a past medical history of Anemia, GI (gastrointestinal bleed), and Ovarian cyst.    Past Surgical History:  has a past surgical history that includes Endoscopy, colon, diagnostic (02/01/2018); cyst removal (Left); Colonoscopy; and pr hysteroscopy,w/endometrial ablation (N/A, 7/18/2018). Social History:  reports that she has been smoking cigarettes. She has a 10.00 pack-year smoking history. She has never used smokeless tobacco. She reports that she does not drink alcohol or use drugs. Family History: family history is not on file. The patients home medications have been reviewed.     Allergies: Sulfa antibiotics    -------------------------------------------------- RESULTS -------------------------------------------------    LABS:  Results for orders placed or performed during the hospital encounter of 10/12/20   CBC auto differential   Result Value Ref Range    WBC 8.8 4.5 - 11.5 E9/L    RBC 3.22 (L) 3.50 - 5.50 E12/L    Hemoglobin 7.2 (L) 11.5 - 15.5 g/dL    Hematocrit 24.1 (L) 34.0 - 48.0 %    MCV 74.8 (L) 80.0 - 99.9 fL    MCH 22.4 (L) 26.0 - 35.0 pg    MCHC 29.9 (L) 32.0 - 34.5 %    RDW 18.6 (H) 11.5 - 15.0 fL    Platelets 367 628 - 670 E9/L    MPV 11.1 7.0 - 12.0 fL    Neutrophils % 66.7 43.0 - 80.0 %    Immature Granulocytes % 0.3 0.0 - 5.0 % Lymphocytes % 25.2 20.0 - 42.0 %    Monocytes % 6.0 2.0 - 12.0 %    Eosinophils % 1.6 0.0 - 6.0 %    Basophils % 0.2 0.0 - 2.0 %    Neutrophils Absolute 5.84 1.80 - 7.30 E9/L    Immature Granulocytes # 0.03 E9/L    Lymphocytes Absolute 2.21 1.50 - 4.00 E9/L    Monocytes Absolute 0.53 0.10 - 0.95 E9/L    Eosinophils Absolute 0.14 0.05 - 0.50 E9/L    Basophils Absolute 0.02 0.00 - 0.20 E9/L   Comprehensive Metabolic Panel   Result Value Ref Range    Sodium 137 132 - 146 mmol/L    Potassium 4.1 3.5 - 5.0 mmol/L    Chloride 106 98 - 107 mmol/L    CO2 25 22 - 29 mmol/L    Anion Gap 6 (L) 7 - 16 mmol/L    Glucose 94 74 - 99 mg/dL    BUN 15 6 - 20 mg/dL    CREATININE 0.8 0.5 - 1.0 mg/dL    GFR Non-African American >60 >=60 mL/min/1.73    GFR African American >60     Calcium 8.8 8.6 - 10.2 mg/dL    Total Protein 6.4 6.4 - 8.3 g/dL    Alb 3.7 3.5 - 5.2 g/dL    Total Bilirubin <0.2 0.0 - 1.2 mg/dL    Alkaline Phosphatase 48 35 - 104 U/L    ALT 8 0 - 32 U/L    AST 11 0 - 31 U/L   Lipase   Result Value Ref Range    Lipase 25 13 - 60 U/L   Lactic Acid, Plasma   Result Value Ref Range    Lactic Acid 0.8 0.5 - 2.2 mmol/L   Troponin   Result Value Ref Range    Troponin <0.01 0.00 - 0.03 ng/mL   Protime-INR   Result Value Ref Range    Protime 11.5 9.3 - 12.4 sec    INR 1.0    APTT   Result Value Ref Range    aPTT 31.3 24.5 - 35.1 sec   Urinalysis   Result Value Ref Range    Color, UA Yellow Straw/Yellow    Clarity, UA CLOUDY (A) Clear    Glucose, Ur Negative Negative mg/dL    Bilirubin Urine Negative Negative    Ketones, Urine 15 (A) Negative mg/dL    Specific Gravity, UA >=1.030 1.005 - 1.030    Blood, Urine LARGE (A) Negative    pH, UA 5.5 5.0 - 9.0    Protein, UA TRACE Negative mg/dL    Urobilinogen, Urine 0.2 <2.0 E.U./dL    Nitrite, Urine Negative Negative    Leukocyte Esterase, Urine Negative Negative   Microscopic Urinalysis   Result Value Ref Range    WBC, UA 0-1 0 - 5 /HPF    RBC, UA >20 0 - 2 /HPF    Epithelial Cells, UA RARE /HPF    Bacteria, UA RARE (A) None Seen /HPF   POC Pregnancy Urine Qual   Result Value Ref Range    HCG, Urine, POC Negative Negative    Lot Number [id number]     Positive QC Pass/Fail Pass     Negative QC Pass/Fail Pass    EKG 12 Lead   Result Value Ref Range    Ventricular Rate 81 BPM    Atrial Rate 81 BPM    P-R Interval 160 ms    QRS Duration 88 ms    Q-T Interval 374 ms    QTc Calculation (Bazett) 434 ms    P Axis 55 degrees    R Axis 25 degrees    T Axis 38 degrees   TYPE AND SCREEN   Result Value Ref Range    ABO/Rh AB POS     Antibody Screen NEG        RADIOLOGY:  XR ACUTE ABD SERIES CHEST 1 VW   Final Result   1. No acute cardiopulmonary abnormality. 2. Nonspecific, nonobstructive gas pattern. 3. Thoracolumbar scoliosis. EKG:  This EKG is signed and interpreted by me. Rate: 81  Rhythm: Sinus  Interpretation: no acute changes  Comparison: no previous EKG available      ------------------------- NURSING NOTES AND VITALS REVIEWED ---------------------------  Date / Time Roomed:  10/12/2020  9:40 PM  ED Bed Assignment:  12/12    The nursing notes within the ED encounter and vital signs as below have been reviewed. Patient Vitals for the past 24 hrs:   BP Temp Temp src Pulse Resp SpO2 Height Weight   10/13/20 0007 (!) 110/51 97.9 °F (36.6 °C) Oral 80 16 99 % -- --   10/12/20 2138 (!) 174/82 -- -- 91 16 100 % 5' 7\" (1.702 m) 215 lb (97.5 kg)   10/12/20 2133 -- 97.9 °F (36.6 °C) Temporal -- -- -- -- --       Oxygen Saturation Interpretation: Normal    ------------------------------------------ PROGRESS NOTES ------------------------------------------  Re-evaluation(s):  Time: 0030. Patients symptoms show no change  Repeat physical examination is not changed    Counseling:  I have spoken with the patient and discussed todays results, in addition to providing specific details for the plan of care and counseling regarding the diagnosis and prognosis.   Their questions are answered at

## 2020-10-13 NOTE — ED NOTES
Dr. Kwame Chambers contacted for admission orders, message left, awaiting return call.      Wale Faye RN  10/13/20 0908

## 2020-10-13 NOTE — ED NOTES
Transfusion stared with bld reaching vein 0122. Patient showing no s/s of adverse reaction.      Eric Nguyen RN  10/13/20 0121

## 2020-10-13 NOTE — ED NOTES
Called fourth floor for nurse/nurse report, call back requested      Grace Quevedo RN  10/13/20 9854

## 2020-10-13 NOTE — ED NOTES
Patient completed unit of RBC with no adverse reaction.  Remained afebrile, alert and oriented with no complaints of discomfort     Colonel Lay RN  10/13/20 0701

## 2020-10-14 VITALS
OXYGEN SATURATION: 99 % | HEIGHT: 67 IN | WEIGHT: 220 LBS | RESPIRATION RATE: 15 BRPM | SYSTOLIC BLOOD PRESSURE: 106 MMHG | BODY MASS INDEX: 34.53 KG/M2 | HEART RATE: 75 BPM | DIASTOLIC BLOOD PRESSURE: 62 MMHG | TEMPERATURE: 98.4 F

## 2020-10-14 LAB
ANION GAP SERPL CALCULATED.3IONS-SCNC: 11 MMOL/L (ref 7–16)
BASOPHILS ABSOLUTE: 0.02 E9/L (ref 0–0.2)
BASOPHILS RELATIVE PERCENT: 0.4 % (ref 0–2)
BUN BLDV-MCNC: 10 MG/DL (ref 6–20)
CALCIUM SERPL-MCNC: 8.8 MG/DL (ref 8.6–10.2)
CHLORIDE BLD-SCNC: 104 MMOL/L (ref 98–107)
CHOLESTEROL, TOTAL: 144 MG/DL (ref 0–199)
CO2: 24 MMOL/L (ref 22–29)
CREAT SERPL-MCNC: 0.8 MG/DL (ref 0.5–1)
EOSINOPHILS ABSOLUTE: 0.18 E9/L (ref 0.05–0.5)
EOSINOPHILS RELATIVE PERCENT: 3.2 % (ref 0–6)
GFR AFRICAN AMERICAN: >60
GFR NON-AFRICAN AMERICAN: >60 ML/MIN/1.73
GLUCOSE BLD-MCNC: 86 MG/DL (ref 74–99)
HCT VFR BLD CALC: 25.1 % (ref 34–48)
HCT VFR BLD CALC: 26.8 % (ref 34–48)
HDLC SERPL-MCNC: 44 MG/DL
HEMOGLOBIN: 7.5 G/DL (ref 11.5–15.5)
HEMOGLOBIN: 8.1 G/DL (ref 11.5–15.5)
IMMATURE GRANULOCYTES #: 0.02 E9/L
IMMATURE GRANULOCYTES %: 0.4 % (ref 0–5)
LDL CHOLESTEROL CALCULATED: 74 MG/DL (ref 0–99)
LYMPHOCYTES ABSOLUTE: 1.45 E9/L (ref 1.5–4)
LYMPHOCYTES RELATIVE PERCENT: 26.1 % (ref 20–42)
MAGNESIUM: 1.8 MG/DL (ref 1.6–2.6)
MCH RBC QN AUTO: 22.7 PG (ref 26–35)
MCHC RBC AUTO-ENTMCNC: 29.9 % (ref 32–34.5)
MCV RBC AUTO: 75.8 FL (ref 80–99.9)
MONOCYTES ABSOLUTE: 0.33 E9/L (ref 0.1–0.95)
MONOCYTES RELATIVE PERCENT: 5.9 % (ref 2–12)
NEUTROPHILS ABSOLUTE: 3.56 E9/L (ref 1.8–7.3)
NEUTROPHILS RELATIVE PERCENT: 64 % (ref 43–80)
PDW BLD-RTO: 19.3 FL (ref 11.5–15)
PHOSPHORUS: 3.5 MG/DL (ref 2.5–4.5)
PLATELET # BLD: 336 E9/L (ref 130–450)
PMV BLD AUTO: 11.4 FL (ref 7–12)
POTASSIUM SERPL-SCNC: 4.1 MMOL/L (ref 3.5–5)
RBC # BLD: 3.31 E12/L (ref 3.5–5.5)
SODIUM BLD-SCNC: 139 MMOL/L (ref 132–146)
TRIGL SERPL-MCNC: 132 MG/DL (ref 0–149)
VLDLC SERPL CALC-MCNC: 26 MG/DL
WBC # BLD: 5.6 E9/L (ref 4.5–11.5)

## 2020-10-14 PROCEDURE — 84100 ASSAY OF PHOSPHORUS: CPT

## 2020-10-14 PROCEDURE — 88342 IMHCHEM/IMCYTCHM 1ST ANTB: CPT

## 2020-10-14 PROCEDURE — 85018 HEMOGLOBIN: CPT

## 2020-10-14 PROCEDURE — 80048 BASIC METABOLIC PNL TOTAL CA: CPT

## 2020-10-14 PROCEDURE — 2580000003 HC RX 258: Performed by: INTERNAL MEDICINE

## 2020-10-14 PROCEDURE — G0378 HOSPITAL OBSERVATION PER HR: HCPCS

## 2020-10-14 PROCEDURE — 83735 ASSAY OF MAGNESIUM: CPT

## 2020-10-14 PROCEDURE — 6370000000 HC RX 637 (ALT 250 FOR IP): Performed by: INTERNAL MEDICINE

## 2020-10-14 PROCEDURE — 96376 TX/PRO/DX INJ SAME DRUG ADON: CPT

## 2020-10-14 PROCEDURE — 6370000000 HC RX 637 (ALT 250 FOR IP): Performed by: OBSTETRICS & GYNECOLOGY

## 2020-10-14 PROCEDURE — 3609012400 HC EGD TRANSORAL BIOPSY SINGLE/MULTIPLE: Performed by: INTERNAL MEDICINE

## 2020-10-14 PROCEDURE — C9113 INJ PANTOPRAZOLE SODIUM, VIA: HCPCS | Performed by: INTERNAL MEDICINE

## 2020-10-14 PROCEDURE — 6360000002 HC RX W HCPCS: Performed by: INTERNAL MEDICINE

## 2020-10-14 PROCEDURE — 36415 COLL VENOUS BLD VENIPUNCTURE: CPT

## 2020-10-14 PROCEDURE — 85014 HEMATOCRIT: CPT

## 2020-10-14 PROCEDURE — 99152 MOD SED SAME PHYS/QHP 5/>YRS: CPT | Performed by: INTERNAL MEDICINE

## 2020-10-14 PROCEDURE — 7100000011 HC PHASE II RECOVERY - ADDTL 15 MIN: Performed by: INTERNAL MEDICINE

## 2020-10-14 PROCEDURE — 88305 TISSUE EXAM BY PATHOLOGIST: CPT

## 2020-10-14 PROCEDURE — 7100000010 HC PHASE II RECOVERY - FIRST 15 MIN: Performed by: INTERNAL MEDICINE

## 2020-10-14 PROCEDURE — 80061 LIPID PANEL: CPT

## 2020-10-14 PROCEDURE — 99153 MOD SED SAME PHYS/QHP EA: CPT | Performed by: INTERNAL MEDICINE

## 2020-10-14 PROCEDURE — 85025 COMPLETE CBC W/AUTO DIFF WBC: CPT

## 2020-10-14 PROCEDURE — 2709999900 HC NON-CHARGEABLE SUPPLY: Performed by: INTERNAL MEDICINE

## 2020-10-14 RX ORDER — FENTANYL CITRATE 50 UG/ML
INJECTION, SOLUTION INTRAMUSCULAR; INTRAVENOUS PRN
Status: DISCONTINUED | OUTPATIENT
Start: 2020-10-14 | End: 2020-10-14 | Stop reason: HOSPADM

## 2020-10-14 RX ORDER — MIDAZOLAM HYDROCHLORIDE 1 MG/ML
INJECTION INTRAMUSCULAR; INTRAVENOUS PRN
Status: DISCONTINUED | OUTPATIENT
Start: 2020-10-14 | End: 2020-10-14 | Stop reason: HOSPADM

## 2020-10-14 RX ORDER — MEDROXYPROGESTERONE ACETATE 10 MG/1
10 TABLET ORAL DAILY
Qty: 30 TABLET | Refills: 3 | Status: SHIPPED | OUTPATIENT
Start: 2020-10-14 | End: 2021-10-08

## 2020-10-14 RX ADMIN — SODIUM CHLORIDE, PRESERVATIVE FREE 10 ML: 5 INJECTION INTRAVENOUS at 09:22

## 2020-10-14 RX ADMIN — FERROUS SULFATE TAB 325 MG (65 MG ELEMENTAL FE) 325 MG: 325 (65 FE) TAB at 11:23

## 2020-10-14 RX ADMIN — FERROUS SULFATE TAB 325 MG (65 MG ELEMENTAL FE) 325 MG: 325 (65 FE) TAB at 09:21

## 2020-10-14 RX ADMIN — MEDROXYPROGESTERONE ACETATE 10 MG: 5 TABLET ORAL at 11:23

## 2020-10-14 RX ADMIN — PANTOPRAZOLE SODIUM 40 MG: 40 INJECTION, POWDER, FOR SOLUTION INTRAVENOUS at 09:22

## 2020-10-14 ASSESSMENT — PAIN SCALES - GENERAL: PAINLEVEL_OUTOF10: 0

## 2020-10-14 NOTE — CARE COORDINATION
10/14/2020 1025 CM note: NO COVID TESTING. Met with pt for transition of care needs. Pt is independent and resides with her children. PCP is Dr Zayda Chapa, uses [address] [place] [address]. No hx HHC/DME. Has hx of receiving iron transfusions at [place] [address]. Plan is home , pt;s mother will provide ride. Pt scheduled for EGD this afternoon.  America URRUTIA

## 2020-10-14 NOTE — PLAN OF CARE
Problem: Pain:  Goal: Pain level will decrease  Description: Pain level will decrease  10/14/2020 0342 by Aubrey Fernandez RN  Outcome: Met This Shift     Problem: Pain:  Goal: Control of acute pain  Description: Control of acute pain  Outcome: Met This Shift     Problem: Pain:  Goal: Control of chronic pain  Description: Control of chronic pain  Outcome: Met This Shift

## 2020-10-14 NOTE — H&P
Immediately prior to the procedure the patient's History and Physical was reviewed- there are no changes with the current vitals. Blood pressure (!) 111/53, pulse 63, temperature 97 °F (36.1 °C), resp. rate 18, height 5' 7\" (1.702 m), weight 220 lb (99.8 kg), last menstrual period 09/24/2020, SpO2 96 %.

## 2020-10-14 NOTE — PLAN OF CARE
Problem: Pain:  Goal: Control of acute pain  Description: Control of acute pain  10/14/2020 1129 by Katherine Silva RN  Outcome: Met This Shift

## 2020-10-14 NOTE — DISCHARGE SUMMARY
Internal Medicine Progress Note     [facility]     Karl Beckwith. Ebony Campa., [email or web address]. Diamond Rubio D.O., DARRELLOARLEY. Chancy Bumpers, D.O. Griffin Torres, MSN, APRN, NP-C  Edson Robins. Christina Becker, MSN, APRN-CNP       Internal Medicine  Discharge Summary    NAME: Norma Serrano  :  1985  MRN:  [id number]  William Kerr MD  ADMITTED: 10/12/2020      DISCHARGED: 10/14/20    ADMITTING PHYSICIAN: Karl Cuello DO    CONSULTANT(S):   IP CONSULT TO GI  IP CONSULT TO OB GYN     ADMITTING DIAGNOSIS:   GI bleed [K92.2]     DISCHARGE DIAGNOSES:   1. Acute blood loss anemia in the setting of dysfunctional uterine bleeding evaluated by the OB/GYN team with initiation of birth control status post EGD evaluation with findings of mild gastritis. BRIEF HISTORY OF PRESENT ILLNESS:   Jade Guillen is a 75-year-old female who presented through [address] [place] [address] emergency department for the evaluation of profound shortness of breath along with dysfunctional uterine bleeding. She was found to be anemic in the emergency department and was transfused with 1 unit of packed red blood cells. She has a known history of peptic ulcer disease and follows with the GI team locally. She avoids nonsteroidal anti-inflammatories and has no other medications directly resulting in GI bleed. She does have epigastric abdominal discomfort and with her shortness of breath she presented to the hospital with concern of peptic ulcer disease. More concerning, the patient has been vaginally bleeding for greater than 3 weeks. She has a previous history of dysfunctional uterine bleeding requiring endometrial ablation in the past.  She had a miscarriage recently as well. It was determined she would be admitted to the hospital for evaluation by both the GI and gynecologic teams. She is feeling better today with less shortness of breath. She denies melanotic stool or bright red rectal bleeding.   She continues to have vaginal bleeding. LABS[de-identified]  Lab Results   Component Value Date    WBC 5.6 10/14/2020    HGB 7.5 (L) 10/14/2020    HCT 25.1 (L) 10/14/2020     10/14/2020     10/14/2020    K 4.1 10/14/2020     10/14/2020    CREATININE 0.8 10/14/2020    BUN 10 10/14/2020    CO2 24 10/14/2020    GLUCOSE 86 10/14/2020    ALT 8 10/12/2020    AST 11 10/12/2020    INR 1.0 10/12/2020     Lab Results   Component Value Date    INR 1.0 10/12/2020    INR 1.2 10/31/2018    INR 1.2 06/14/2013    PROTIME 11.5 10/12/2020    PROTIME 13.1 (H) 10/31/2018    PROTIME 12.3 06/14/2013      Lab Results   Component Value Date    TSH 4.740 (H) 02/26/2016     Lab Results   Component Value Date    TRIG 132 10/14/2020     Lab Results   Component Value Date    HDL 44 10/14/2020     Lab Results   Component Value Date    LDLCALC 74 10/14/2020     No results found for: LABA1C    IMAGING:  Xr Acute Abd Series Chest 1 Vw    Result Date: 10/12/2020  EXAMINATION: TWO XRAY VIEWS OF THE ABDOMEN AND SINGLE  XRAY VIEW OF THE CHEST 10/12/2020 10:34 pm COMPARISON: Chest radiograph of 10/31/2018 HISTORY: ORDERING SYSTEM PROVIDED HISTORY: epigastric pain TECHNOLOGIST PROVIDED HISTORY: Reason for exam:->epigastric pain FINDINGS: Chest: There is dextroconvex thoracic scoliosis. There is no cardiomegaly, congestion, infiltrate, pleural effusion or pneumothorax. Abdomen: There is no free air. The bowel gas pattern is nonspecific and nonobstructive. Specifically, there is air and stool within nondilated colon. There is gas scattered throughout nondilated small bowel. No suspicious calcifications are seen. There is mild levoconvex lumbar scoliosis. 1. No acute cardiopulmonary abnormality. 2. Nonspecific, nonobstructive gas pattern. 3. Thoracolumbar scoliosis. Us Non Ob Transvaginal    Result Date: 10/13/2020  EXAMINATION: PELVIC ULTRASOUND 10/13/2020 TECHNIQUE: Transvaginal pelvic ultrasound was performed.   Color Doppler evaluation was performed. COMPARISON: CT abdomen and pelvis from May 31, 2019. HISTORY: ORDERING SYSTEM PROVIDED HISTORY: Dysfunctional uterine bleeding TECHNOLOGIST PROVIDED HISTORY: Reason for exam:->Dysfunctional uterine bleeding What reading provider will be dictating this exam?->CRC FINDINGS: Measurements: Uterus:  7.3 cm Endometrial stripe:  14 mm Right Ovary:  4.2 x 2.9 x 3.8 cm for a total volume of 24.7 mL Left Ovary:  3.7 x 2.3 x 1.8 cm for a total volume of 8.2 mL Ultrasound Findings: Uterus: Normal appearance of the imaged uterus. No uterine masses identified. Endometrial stripe: Endometrial stripe is within normal limits. Right Ovary: Right ovary is within normal limits. Simple appearing 3.8 cm functional cyst within the right ovary. There is normal arterial and venous doppler flow. Left Ovary:  Left ovary is within normal limits. Simple appearing 1.6 cm functional cyst within the left ovary. There is normal arterial and venous doppler flow. Free Fluid: No evidence of free fluid. Normal appearance of the uterus and bilateral ovaries. There are no adnexal masses. Simple appearing bilateral functional cysts in the ovaries as above. Normal Doppler flow within the ovaries. All         HOSPITAL COURSE:   Dennise Devi did very well throughout the hospitalization. She was evaluated by the OB/GYN and GI teams. In the setting of dysfunctional uterine bleeding, plans are for close outpatient follow-up with the OB/GYN team following the institution of birth control. Formal pelvic examination will be performed in the office setting. Transvaginal ultrasound indicated normal appearance of the uterus and bilateral ovaries. The patient underwent EGD evaluation as well to rule out a concomitant GI bleed. Findings indicated mild gastritis. She understands the importance of close outpatient follow-up with the OB/GYN team was deemed acceptable for discharge home.     BRIEF PHYSICAL EXAMINATION AND LABORATORIES ON DAY OF DISCHARGE:  VITALS:  BP (!) 111/53   Pulse 63   Temp 97.8 °F (36.6 °C) (Oral)   Resp 18   Ht 5' 7\" (1.702 m)   Wt 220 lb (99.8 kg)   LMP 09/24/2020   SpO2 96%   BMI 34.46 kg/m²     HEENT:  CARO. EOMI. Sclera clear. Buccal mucosa moist.    Neck:  Supple. Trachea midline. No thyromegaly. No JVD. No bruits. Heart:  Rhythm regular, rate controlled. No murmurs. Lungs:  Symmetrical. Clear to auscultation bilaterally. No wheezes. No rhonchi. No rales. Abdomen: Soft. Non-tender. Non-distended. Bowel sounds positive. No organomegaly or masses. No pain on palpation    Extremities:  Peripheral pulses present. No peripheral edema. No ulcers. Neurologic:  Alert x 3. No focal deficit. Cranial nerves grossly intact. Skin:  No petechia. No hemorrhage. No wounds. DISPOSITION:  The patient's condition is good. At this time the patient is without objective evidence of an acute process requiring continuing hospitalization or inpatient management. They are stable for discharge with outpatient follow-up. I have spoken with the patient and discussed the results of the current hospitalization, in addition to providing specific details for the plan of care and counseling regarding the diagnosis and prognosis. The plan has been discussed in detail and they are aware of the specific conditions for emergent return, as well as the importance of follow-up.   Their questions are answered at this time and they are agreeable with the plan for discharge to home    DISCHARGE MEDICATIONS:    Susanna Cortes   Home Medication Instructions [facility]:[id number]    Printed on:10/14/20 4791   Medication Information                      ferrous sulfate (TONY-DANNIE) 325 (65 Fe) MG tablet  Take 1 tablet by mouth 3 times daily (with meals)             medroxyPROGESTERone (PROVERA) 10 MG tablet  Take 1 tablet by mouth daily             Pantoprazole Sodium (PROTONIX PO)  Take 40 mg by mouth daily Unknown dose FOLLOW UP/INSTRUCTIONS:  · This patient is instructed to follow-up with her primary care physician. · Patient is instructed to follow-up with the consults listed above as directed by them. · she is instructed to resume home medications and take new medications as indicated in the list above. · If the patient has a recurrence of symptoms, she is instructed to go to the ED. Preparing for this patient's discharge, including paperwork, orders, instructions, and meeting with patient did require > 40 minutes.     Ester Bhatia DO   10/14/2020  9:06 AM

## 2020-10-14 NOTE — PROCEDURES
Procedure:  Esophagogastroduodenoscopy with Biopsy    Indication:  Anemia and h/o gastric ulcers 2018    Sedation Fentanyl 100 mcg IV                   Versed 6.0 mg total given in 2.0 mg aliquots      Endoscope was advanced easily through mouth to second portion of duodenum      Oropharynx views are limited but grossly normal.    Esophagus:   Mucosa is normal.  GEJ at 35 cm. Stomach:   Antrum is normal    Gastric body has patchy small area of mild gastritis- Bx taken    Retroflexed views show normal fundus and cardia. Duodenum: Bulb is normal.    Second portion of duodenum is normal.    EBL: less than one cc    IMPRESSION AND PLAN:     1. Mild gastritis    DUB most likely source of anemia. Hold further GI evaluation      Follow up as outpatient in office, call [phone] to schedule for appointment.       Brenda Malhotra MD  10/14/2020  6:21 PM

## 2020-10-14 NOTE — H&P
Interval H&P     H&P reviewed no changes, patient denies any melena hematochezia or hematemesis overnight. Vitals:    10/14/20 0745   BP: (!) 111/53   Pulse: 63   Resp: 18   Temp:    SpO2: 96%      Plan; proceed with EGD this afternoon to evaluate patient's epigastric pain and rule out peptic ulcer disease source patient's anemia.      Alexandr Yoo DO   GI Fellow   8:53 AM

## 2020-11-18 ENCOUNTER — HOSPITAL ENCOUNTER (OUTPATIENT)
Age: 35
Discharge: HOME | End: 2020-11-18
Payer: COMMERCIAL

## 2020-11-18 LAB
ANISOCYTOSIS: ABNORMAL
BASOPHILS ABSOLUTE: 0.03 E9/L (ref 0–0.2)
BASOPHILS RELATIVE PERCENT: 0.5 % (ref 0–2)
EOSINOPHILS ABSOLUTE: 0.14 E9/L (ref 0.05–0.5)
EOSINOPHILS RELATIVE PERCENT: 2.2 % (ref 0–6)
FERRITIN: 2 NG/ML
FOLATE: 5.8 NG/ML (ref 4.8–24.2)
HCT VFR BLD CALC: 28.2 % (ref 34–48)
HEMOGLOBIN: 8 G/DL (ref 11.5–15.5)
HYPOCHROMIA: ABNORMAL
IMMATURE GRANULOCYTES #: 0.02 E9/L
IMMATURE GRANULOCYTES %: 0.3 % (ref 0–5)
IMMATURE RETIC FRACT: 23.9 % (ref 3–15.9)
IRON SATURATION: 5 % (ref 15–50)
IRON: 22 MCG/DL (ref 37–145)
LYMPHOCYTES ABSOLUTE: 1.25 E9/L (ref 1.5–4)
LYMPHOCYTES RELATIVE PERCENT: 19.7 % (ref 20–42)
MCH RBC QN AUTO: 20.5 PG (ref 26–35)
MCHC RBC AUTO-ENTMCNC: 28.4 % (ref 32–34.5)
MCV RBC AUTO: 72.1 FL (ref 80–99.9)
MONOCYTES ABSOLUTE: 0.36 E9/L (ref 0.1–0.95)
MONOCYTES RELATIVE PERCENT: 5.7 % (ref 2–12)
NEUTROPHILS ABSOLUTE: 4.55 E9/L (ref 1.8–7.3)
NEUTROPHILS RELATIVE PERCENT: 71.6 % (ref 43–80)
PDW BLD-RTO: 19.3 FL (ref 11.5–15)
PLATELET # BLD: 368 E9/L (ref 130–450)
PMV BLD AUTO: 10.6 FL (ref 7–12)
RBC # BLD: 3.91 E12/L (ref 3.5–5.5)
RETIC HGB EQUIVALENT: 17.2 PG (ref 28.2–36.6)
RETICULOCYTE ABSOLUTE COUNT: 0.07 E12/L
RETICULOCYTE COUNT PCT: 1.9 % (ref 0.4–1.9)
TOTAL IRON BINDING CAPACITY: 403 MCG/DL (ref 250–450)
VITAMIN B-12: 622 PG/ML (ref 211–946)
WBC # BLD: 6.4 E9/L (ref 4.5–11.5)

## 2020-11-18 PROCEDURE — 82607 VITAMIN B-12: CPT

## 2020-11-18 PROCEDURE — 85045 AUTOMATED RETICULOCYTE COUNT: CPT

## 2020-11-18 PROCEDURE — 83550 IRON BINDING TEST: CPT

## 2020-11-18 PROCEDURE — 36415 COLL VENOUS BLD VENIPUNCTURE: CPT

## 2020-11-18 PROCEDURE — 83540 ASSAY OF IRON: CPT

## 2020-11-18 PROCEDURE — 82728 ASSAY OF FERRITIN: CPT

## 2020-11-18 PROCEDURE — 85025 COMPLETE CBC W/AUTO DIFF WBC: CPT

## 2020-11-18 PROCEDURE — 83516 IMMUNOASSAY NONANTIBODY: CPT

## 2020-11-18 PROCEDURE — 82746 ASSAY OF FOLIC ACID SERUM: CPT

## 2020-11-21 LAB
TISSUE TRANSGLUTAMINASE ANTIBODY: 2 U/ML (ref 0–5)
TISSUE TRANSGLUTAMINASE IGA: <2 U/ML (ref 0–3)

## 2020-12-14 PROBLEM — D50.9 IRON DEFICIENCY ANEMIA, UNSPECIFIED: Status: ACTIVE | Noted: 2020-12-14

## 2020-12-14 RX ORDER — DIPHENHYDRAMINE HYDROCHLORIDE 50 MG/ML
50 INJECTION INTRAMUSCULAR; INTRAVENOUS ONCE
Status: CANCELLED | OUTPATIENT
Start: 2020-12-15 | End: 2020-12-15

## 2020-12-14 RX ORDER — SODIUM CHLORIDE 0.9 % (FLUSH) 0.9 %
5 SYRINGE (ML) INJECTION PRN
Status: CANCELLED | OUTPATIENT
Start: 2020-12-15

## 2020-12-14 RX ORDER — SODIUM CHLORIDE 9 MG/ML
INJECTION, SOLUTION INTRAVENOUS CONTINUOUS
Status: CANCELLED | OUTPATIENT
Start: 2020-12-15

## 2020-12-14 RX ORDER — EPINEPHRINE 1 MG/ML
0.3 INJECTION, SOLUTION, CONCENTRATE INTRAVENOUS PRN
Status: CANCELLED | OUTPATIENT
Start: 2020-12-15

## 2020-12-14 RX ORDER — METHYLPREDNISOLONE SODIUM SUCCINATE 125 MG/2ML
125 INJECTION, POWDER, LYOPHILIZED, FOR SOLUTION INTRAMUSCULAR; INTRAVENOUS ONCE
Status: CANCELLED | OUTPATIENT
Start: 2020-12-15 | End: 2020-12-15

## 2020-12-14 RX ORDER — HEPARIN SODIUM (PORCINE) LOCK FLUSH IV SOLN 100 UNIT/ML 100 UNIT/ML
500 SOLUTION INTRAVENOUS PRN
Status: CANCELLED | OUTPATIENT
Start: 2020-12-15

## 2020-12-14 RX ORDER — SODIUM CHLORIDE 0.9 % (FLUSH) 0.9 %
10 SYRINGE (ML) INJECTION PRN
Status: CANCELLED | OUTPATIENT
Start: 2020-12-15

## 2020-12-30 RX ORDER — HEPARIN SODIUM (PORCINE) LOCK FLUSH IV SOLN 100 UNIT/ML 100 UNIT/ML
500 SOLUTION INTRAVENOUS PRN
Status: CANCELLED | OUTPATIENT
Start: 2021-01-04

## 2020-12-30 RX ORDER — EPINEPHRINE 1 MG/ML
0.3 INJECTION, SOLUTION, CONCENTRATE INTRAVENOUS PRN
Status: CANCELLED | OUTPATIENT
Start: 2021-01-04

## 2020-12-30 RX ORDER — SODIUM CHLORIDE 9 MG/ML
INJECTION, SOLUTION INTRAVENOUS CONTINUOUS
Status: CANCELLED | OUTPATIENT
Start: 2021-01-04

## 2020-12-30 RX ORDER — DIPHENHYDRAMINE HYDROCHLORIDE 50 MG/ML
50 INJECTION INTRAMUSCULAR; INTRAVENOUS ONCE
Status: CANCELLED | OUTPATIENT
Start: 2021-01-04 | End: 2021-01-04

## 2020-12-30 RX ORDER — SODIUM CHLORIDE 0.9 % (FLUSH) 0.9 %
5 SYRINGE (ML) INJECTION PRN
Status: CANCELLED | OUTPATIENT
Start: 2021-01-04

## 2020-12-30 RX ORDER — SODIUM CHLORIDE 0.9 % (FLUSH) 0.9 %
10 SYRINGE (ML) INJECTION PRN
Status: CANCELLED | OUTPATIENT
Start: 2021-01-04

## 2020-12-30 RX ORDER — METHYLPREDNISOLONE SODIUM SUCCINATE 125 MG/2ML
125 INJECTION, POWDER, LYOPHILIZED, FOR SOLUTION INTRAMUSCULAR; INTRAVENOUS ONCE
Status: CANCELLED | OUTPATIENT
Start: 2021-01-04 | End: 2021-01-04

## 2021-01-04 ENCOUNTER — HOSPITAL ENCOUNTER (OUTPATIENT)
Age: 36
Discharge: HOME | End: 2021-01-04
Payer: COMMERCIAL

## 2021-01-04 LAB
ALBUMIN SERPL-MCNC: 3.9 G/DL (ref 3.5–5.2)
ALP BLD-CCNC: 43 U/L (ref 35–104)
ALT SERPL-CCNC: 16 U/L (ref 0–32)
ANION GAP SERPL CALCULATED.3IONS-SCNC: 9 MMOL/L (ref 7–16)
ANISOCYTOSIS: ABNORMAL
AST SERPL-CCNC: 13 U/L (ref 0–31)
BASOPHILS ABSOLUTE: 0.02 E9/L (ref 0–0.2)
BASOPHILS RELATIVE PERCENT: 0.3 % (ref 0–2)
BILIRUB SERPL-MCNC: 0.2 MG/DL (ref 0–1.2)
BUN BLDV-MCNC: 17 MG/DL (ref 6–20)
CALCIUM SERPL-MCNC: 9.1 MG/DL (ref 8.6–10.2)
CHLORIDE BLD-SCNC: 106 MMOL/L (ref 98–107)
CO2: 25 MMOL/L (ref 22–29)
CREAT SERPL-MCNC: 0.8 MG/DL (ref 0.5–1)
EOSINOPHILS ABSOLUTE: 0.17 E9/L (ref 0.05–0.5)
EOSINOPHILS RELATIVE PERCENT: 2.3 % (ref 0–6)
FERRITIN: 3 NG/ML
FOLATE: 4.9 NG/ML (ref 4.8–24.2)
GFR AFRICAN AMERICAN: >60
GFR NON-AFRICAN AMERICAN: >60 ML/MIN/1.73
GLUCOSE BLD-MCNC: 100 MG/DL (ref 74–99)
HCT VFR BLD CALC: 29.8 % (ref 34–48)
HEMOGLOBIN: 8.3 G/DL (ref 11.5–15.5)
HYPOCHROMIA: ABNORMAL
IMMATURE GRANULOCYTES #: 0.04 E9/L
IMMATURE GRANULOCYTES %: 0.5 % (ref 0–5)
IRON SATURATION: 5 % (ref 15–50)
IRON: 18 MCG/DL (ref 37–145)
LYMPHOCYTES ABSOLUTE: 1.46 E9/L (ref 1.5–4)
LYMPHOCYTES RELATIVE PERCENT: 19.7 % (ref 20–42)
MCH RBC QN AUTO: 19 PG (ref 26–35)
MCHC RBC AUTO-ENTMCNC: 27.9 % (ref 32–34.5)
MCV RBC AUTO: 68.3 FL (ref 80–99.9)
MONOCYTES ABSOLUTE: 0.54 E9/L (ref 0.1–0.95)
MONOCYTES RELATIVE PERCENT: 7.3 % (ref 2–12)
NEUTROPHILS ABSOLUTE: 5.2 E9/L (ref 1.8–7.3)
NEUTROPHILS RELATIVE PERCENT: 69.9 % (ref 43–80)
PDW BLD-RTO: 20.4 FL (ref 11.5–15)
PLATELET # BLD: 389 E9/L (ref 130–450)
PMV BLD AUTO: 10.3 FL (ref 7–12)
POIKILOCYTES: ABNORMAL
POTASSIUM SERPL-SCNC: 4.8 MMOL/L (ref 3.5–5)
RBC # BLD: 4.36 E12/L (ref 3.5–5.5)
SODIUM BLD-SCNC: 140 MMOL/L (ref 132–146)
T4 FREE: 0.94 NG/DL (ref 0.93–1.7)
TOTAL IRON BINDING CAPACITY: 378 MCG/DL (ref 250–450)
TOTAL PROTEIN: 6.9 G/DL (ref 6.4–8.3)
TSH SERPL DL<=0.05 MIU/L-ACNC: 3.87 UIU/ML (ref 0.27–4.2)
VITAMIN B-12: 663 PG/ML (ref 211–946)
WBC # BLD: 7.4 E9/L (ref 4.5–11.5)

## 2021-01-04 PROCEDURE — 85025 COMPLETE CBC W/AUTO DIFF WBC: CPT

## 2021-01-04 PROCEDURE — 83550 IRON BINDING TEST: CPT

## 2021-01-04 PROCEDURE — 82728 ASSAY OF FERRITIN: CPT

## 2021-01-04 PROCEDURE — 81269 HBA1/HBA2 GENE DUP/DEL VRNTS: CPT

## 2021-01-04 PROCEDURE — 84443 ASSAY THYROID STIM HORMONE: CPT

## 2021-01-04 PROCEDURE — 80053 COMPREHEN METABOLIC PANEL: CPT

## 2021-01-04 PROCEDURE — 81259 HBA1/HBA2 FULL GENE SEQUENCE: CPT

## 2021-01-04 PROCEDURE — 83516 IMMUNOASSAY NONANTIBODY: CPT

## 2021-01-04 PROCEDURE — 83021 HEMOGLOBIN CHROMOTOGRAPHY: CPT

## 2021-01-04 PROCEDURE — 83020 HEMOGLOBIN ELECTROPHORESIS: CPT

## 2021-01-04 PROCEDURE — 82746 ASSAY OF FOLIC ACID SERUM: CPT

## 2021-01-04 PROCEDURE — 84439 ASSAY OF FREE THYROXINE: CPT

## 2021-01-04 PROCEDURE — 36415 COLL VENOUS BLD VENIPUNCTURE: CPT

## 2021-01-04 PROCEDURE — 83540 ASSAY OF IRON: CPT

## 2021-01-04 PROCEDURE — 82607 VITAMIN B-12: CPT

## 2021-01-05 LAB — PATHOLOGIST REVIEW: NORMAL

## 2021-01-07 ENCOUNTER — HOSPITAL ENCOUNTER (OUTPATIENT)
Dept: INFUSION THERAPY | Age: 36
Setting detail: INFUSION SERIES
Discharge: HOME | End: 2021-01-07
Payer: COMMERCIAL

## 2021-01-07 VITALS
OXYGEN SATURATION: 100 % | HEART RATE: 72 BPM | RESPIRATION RATE: 18 BRPM | SYSTOLIC BLOOD PRESSURE: 134 MMHG | TEMPERATURE: 98 F | DIASTOLIC BLOOD PRESSURE: 62 MMHG

## 2021-01-07 DIAGNOSIS — D50.9 IRON DEFICIENCY ANEMIA, UNSPECIFIED IRON DEFICIENCY ANEMIA TYPE: Primary | ICD-10-CM

## 2021-01-07 LAB
GLIADIN ANTIBODIES IGA: 4 UNITS (ref 0–19)
GLIADIN ANTIBODIES IGG: 3 UNITS (ref 0–19)

## 2021-01-07 PROCEDURE — 2580000003 HC RX 258: Performed by: NURSE PRACTITIONER

## 2021-01-07 PROCEDURE — 96365 THER/PROPH/DIAG IV INF INIT: CPT

## 2021-01-07 PROCEDURE — 6360000002 HC RX W HCPCS: Performed by: NURSE PRACTITIONER

## 2021-01-07 RX ORDER — HEPARIN SODIUM (PORCINE) LOCK FLUSH IV SOLN 100 UNIT/ML 100 UNIT/ML
500 SOLUTION INTRAVENOUS PRN
Status: CANCELLED | OUTPATIENT
Start: 2021-01-07

## 2021-01-07 RX ORDER — SODIUM CHLORIDE 0.9 % (FLUSH) 0.9 %
10 SYRINGE (ML) INJECTION PRN
Status: DISCONTINUED | OUTPATIENT
Start: 2021-01-07 | End: 2021-01-08 | Stop reason: HOSPADM

## 2021-01-07 RX ORDER — METHYLPREDNISOLONE SODIUM SUCCINATE 125 MG/2ML
125 INJECTION, POWDER, LYOPHILIZED, FOR SOLUTION INTRAMUSCULAR; INTRAVENOUS ONCE
Status: CANCELLED | OUTPATIENT
Start: 2021-01-07 | End: 2021-01-07

## 2021-01-07 RX ORDER — EPINEPHRINE 1 MG/ML
0.3 INJECTION, SOLUTION, CONCENTRATE INTRAVENOUS PRN
Status: CANCELLED | OUTPATIENT
Start: 2021-01-07

## 2021-01-07 RX ORDER — DIPHENHYDRAMINE HYDROCHLORIDE 50 MG/ML
50 INJECTION INTRAMUSCULAR; INTRAVENOUS ONCE
Status: CANCELLED | OUTPATIENT
Start: 2021-01-07 | End: 2021-01-07

## 2021-01-07 RX ORDER — SODIUM CHLORIDE 9 MG/ML
INJECTION, SOLUTION INTRAVENOUS CONTINUOUS
Status: ACTIVE | OUTPATIENT
Start: 2021-01-07 | End: 2021-01-07

## 2021-01-07 RX ORDER — SODIUM CHLORIDE 9 MG/ML
INJECTION, SOLUTION INTRAVENOUS CONTINUOUS
Status: CANCELLED | OUTPATIENT
Start: 2021-01-07

## 2021-01-07 RX ORDER — SODIUM CHLORIDE 0.9 % (FLUSH) 0.9 %
5 SYRINGE (ML) INJECTION PRN
Status: CANCELLED | OUTPATIENT
Start: 2021-01-07

## 2021-01-07 RX ORDER — SODIUM CHLORIDE 0.9 % (FLUSH) 0.9 %
10 SYRINGE (ML) INJECTION PRN
Status: CANCELLED | OUTPATIENT
Start: 2021-01-07

## 2021-01-07 RX ADMIN — SODIUM CHLORIDE, PRESERVATIVE FREE 10 ML: 5 INJECTION INTRAVENOUS at 10:25

## 2021-01-07 RX ADMIN — SODIUM CHLORIDE: 9 INJECTION, SOLUTION INTRAVENOUS at 10:25

## 2021-01-07 RX ADMIN — FERRIC CARBOXYMALTOSE INJECTION 750 MG: 50 INJECTION, SOLUTION INTRAVENOUS at 10:45

## 2021-01-07 RX ADMIN — SODIUM CHLORIDE, PRESERVATIVE FREE 10 ML: 5 INJECTION INTRAVENOUS at 11:10

## 2021-01-29 LAB
Lab: NORMAL
REPORT: NORMAL
THIS TEST SENT TO: NORMAL

## 2021-08-17 ENCOUNTER — OFFICE VISIT (OUTPATIENT)
Dept: PRIMARY CARE CLINIC | Age: 36
End: 2021-08-17
Payer: COMMERCIAL

## 2021-08-17 VITALS
DIASTOLIC BLOOD PRESSURE: 80 MMHG | HEART RATE: 67 BPM | HEIGHT: 67 IN | BODY MASS INDEX: 31.71 KG/M2 | WEIGHT: 202 LBS | SYSTOLIC BLOOD PRESSURE: 138 MMHG | TEMPERATURE: 97.5 F | OXYGEN SATURATION: 98 %

## 2021-08-17 DIAGNOSIS — R06.02 SHORTNESS OF BREATH: Primary | ICD-10-CM

## 2021-08-17 DIAGNOSIS — D50.9 IRON DEFICIENCY ANEMIA, UNSPECIFIED IRON DEFICIENCY ANEMIA TYPE: ICD-10-CM

## 2021-08-17 DIAGNOSIS — R00.2 PALPITATIONS: ICD-10-CM

## 2021-08-17 DIAGNOSIS — Z20.822 EXPOSURE TO CONFIRMED CASE OF COVID-19: ICD-10-CM

## 2021-08-17 LAB
Lab: NORMAL
PERFORMING INSTRUMENT: NORMAL
QC PASS/FAIL: NORMAL
SARS-COV-2, POC: NORMAL

## 2021-08-17 PROCEDURE — 99214 OFFICE O/P EST MOD 30 MIN: CPT | Performed by: NURSE PRACTITIONER

## 2021-08-17 PROCEDURE — 4004F PT TOBACCO SCREEN RCVD TLK: CPT | Performed by: NURSE PRACTITIONER

## 2021-08-17 PROCEDURE — 87426 SARSCOV CORONAVIRUS AG IA: CPT | Performed by: NURSE PRACTITIONER

## 2021-08-17 PROCEDURE — G8427 DOCREV CUR MEDS BY ELIG CLIN: HCPCS | Performed by: NURSE PRACTITIONER

## 2021-08-17 PROCEDURE — G8417 CALC BMI ABV UP PARAM F/U: HCPCS | Performed by: NURSE PRACTITIONER

## 2021-08-17 PROCEDURE — 93000 ELECTROCARDIOGRAM COMPLETE: CPT | Performed by: NURSE PRACTITIONER

## 2021-08-17 NOTE — PROGRESS NOTES
Chief Complaint   Shortness of Breath (x4 days ), Headache, and Cough    History of Present Illness   Source of history provided by:  patientJesus Galeano is a 43 y.o. old female who presents to walk-in with complaints of Chills, Headache, dry Cough, Shortness of breath, Fatigue and Patient was exposed to someone who is a known Covid-19 infected person or directly caring for such person x 4 days. States symptoms have persistent since onset. Has been taking nothing for the symptoms. Currently denies any Fever, Nausea, Vomiting, Chest Pain, Abdominal Pain or Rash. Denies any hx of asthma. Reports daily tobacco use, 1/2 ppd. Patient denies COVID-19 infection in the last 90 days. Patient has not received a COVID-19 vaccination. Exposed to Ezio by co-worker 1 week ago. ROS   Pertinent positives and negatives are stated within HPI, all other systems reviewed and are negative. Past Medical History:  has a past medical history of Anemia, GI (gastrointestinal bleed), History of blood transfusion, and Ovarian cyst.   Past Surgical History:  has a past surgical history that includes Endoscopy, colon, diagnostic (02/01/2018); cyst removal (Left); Colonoscopy; pr hysteroscopy,w/endometrial ablation (N/A, 7/18/2018); and Upper gastrointestinal endoscopy (N/A, 10/14/2020). Social History:  reports that she has been smoking cigarettes. She has a 10.00 pack-year smoking history. She has never used smokeless tobacco. She reports that she does not drink alcohol and does not use drugs. Family History: family history is not on file. Allergies: Sulfa antibiotics    Physical Exam   Vital Signs:  /80   Pulse 67   Temp 97.5 °F (36.4 °C) (Temporal)   Ht 5' 7\" (1.702 m)   Wt 202 lb (91.6 kg)   SpO2 98%   BMI 31.64 kg/m²    Oxygen Saturation Interpretation: Normal.    Constitutional:  Alert, development consistent with age. NAD. Head:  NC/NT. Airway patent. Ears: TMs clear bilaterally.  Canals without exudate or swelling bilaterally. Mouth: Posterior pharynx with mild erythema and clear postnasal drip. There is no tonsillar hypertrophy or exudate. Neck:  Normal ROM. Supple. There is no anterior cervical adenopathy noted. Lungs: CTAB without wheezes, rales, or rhonchi. CV:  Regular rate and rhythm, normal heart sounds, without pathological murmurs, ectopy, gallops, or rubs. Skin:  Normal turgor. Warm, dry, without visible rash. Lymphatic: No lymphangitis or adenopathy noted. Neurological:  Oriented. Motor functions intact.     Lab / Imaging Results   (All laboratory and radiology results have been personally reviewed by myself)  Labs:  Orders Only on 08/17/2021   Component Date Value Ref Range Status    WBC 08/17/2021 6.0  4.5 - 11.5 E9/L Final    RBC 08/17/2021 5.02  3.50 - 5.50 E12/L Final    Hemoglobin 08/17/2021 14.4  11.5 - 15.5 g/dL Final    Hematocrit 08/17/2021 43.0  34.0 - 48.0 % Final    MCV 08/17/2021 85.7  80.0 - 99.9 fL Final    MCH 08/17/2021 28.7  26.0 - 35.0 pg Final    MCHC 08/17/2021 33.5  32.0 - 34.5 % Final    RDW 08/17/2021 14.9  11.5 - 15.0 fL Final    Platelets 44/98/9974 260  130 - 450 E9/L Final    MPV 08/17/2021 12.9* 7.0 - 12.0 fL Final    Neutrophils % 08/17/2021 62.6  43.0 - 80.0 % Final    Immature Granulocytes % 08/17/2021 0.3  0.0 - 5.0 % Final    Lymphocytes % 08/17/2021 29.1  20.0 - 42.0 % Final    Monocytes % 08/17/2021 5.0  2.0 - 12.0 % Final    Eosinophils % 08/17/2021 2.7  0.0 - 6.0 % Final    Basophils % 08/17/2021 0.3  0.0 - 2.0 % Final    Neutrophils Absolute 08/17/2021 3.76  1.80 - 7.30 E9/L Final    Immature Granulocytes # 08/17/2021 0.02  E9/L Final    Lymphocytes Absolute 08/17/2021 1.75  1.50 - 4.00 E9/L Final    Monocytes Absolute 08/17/2021 0.30  0.10 - 0.95 E9/L Final    Eosinophils Absolute 08/17/2021 0.16  0.05 - 0.50 E9/L Final    Basophils Absolute 08/17/2021 0.02  0.00 - 0.20 E9/L Final    Sodium 08/17/2021 140  132 - 146 mmol/L Final    Potassium 08/17/2021 4.3  3.5 - 5.0 mmol/L Final    Chloride 08/17/2021 105  98 - 107 mmol/L Final    CO2 08/17/2021 27  22 - 29 mmol/L Final    Anion Gap 08/17/2021 8  7 - 16 mmol/L Final    Glucose 08/17/2021 81  74 - 99 mg/dL Final    BUN 08/17/2021 11  6 - 20 mg/dL Final    CREATININE 08/17/2021 0.8  0.5 - 1.0 mg/dL Final    GFR Non- 08/17/2021 >60  >=60 mL/min/1.73 Final    Comment: Chronic Kidney Disease: less than 60 ml/min/1.73 sq.m. Kidney Failure: less than 15 ml/min/1.73 sq.m. Results valid for patients 18 years and older.  GFR  08/17/2021 >60   Final    Calcium 08/17/2021 9.6  8.6 - 10.2 mg/dL Final    Total Protein 08/17/2021 7.7  6.4 - 8.3 g/dL Final    Albumin 08/17/2021 4.5  3.5 - 5.2 g/dL Final    Total Bilirubin 08/17/2021 0.3  0.0 - 1.2 mg/dL Final    Alkaline Phosphatase 08/17/2021 42  35 - 104 U/L Final    ALT 08/17/2021 40* 0 - 32 U/L Final    AST 08/17/2021 22  0 - 31 U/L Final    TSH 08/17/2021 3.080  0.270 - 4.200 uIU/mL Final    Troponin, High Sensitivity 08/17/2021 6  0 - 9 ng/L Final    Comment: High Sensitivity Troponin values cannot be compared with  other Troponin methodologies. Patients with high levels of Biotin oral intake (i.e. >5 mg/day)  may have falsely decreased Troponin levels. Samples collected  within 8 hours of biotin intake may require additional information  for diagnosis.  D-Dimer, Quant 08/17/2021 <200  ng/mL DDU Final    Comment: D-DIMER Interpretation:  <  230  ng/mL (D-DU)  Indicates low probability for PE/DVT   = 232  ng/mL (D-DU)  Upper Limit of Normal  >= 4000 ng/mL (D-DU)  This level could suggest the presence                        of DIC. Clinical correlation may be                        helpful.       Iron 08/17/2021 31* 37 - 145 mcg/dL Final    TIBC 08/17/2021 320  250 - 450 mcg/dL Final    Iron Saturation 08/17/2021 10* 15 - 50 % Final   Office Visit on 08/17/2021   Component Date Value Ref Range Status    SARS-COV-2, POC 08/17/2021 Not-Detected  Not Detected Final    Lot Number 08/17/2021 [id number]   Final    QC Pass/Fail 08/17/2021 pass   Final    Performing Instrument 08/17/2021 BD Veritor   Final     Imaging: All Radiology results interpreted by Radiologist unless otherwise noted. No results found. EKG #1:  Interpreted by this provider unless otherwise noted. Rhythm: sinus bradycardia  Rate: 54  Axis: normal  Ectopy: none  Conduction: normal  ST Segments: normal  T Waves: normal  Q Waves: none    Clinical Impression: sinus bradycardia    Medical Decision Making   This is a 53-year-old female who presents today for shortness of breath, palpitations, headache and cough for the last 4 days. Patient states she was exposed to COVID-19 1 week ago. Rapid COVID-19 negative in office, patient advised results. Confirmatory PCR COVID-19 swab obtained and sent to lab, will advise patient results once available. Advised to self quarantine at home until test results are back. Patient was also sent for stat labs which showed no leukocytosis. Her D-dimer and troponin were both negative. Iron obtained as well for history of iron deficiency anemia, hemoglobin 14.4 with iron 31 and iron saturation 10 which have both increased from her recent labs in our system. Patient was advised of these results. EKG was obtained in office showing sinus bradycardia with no ectopy, ST elevation, or presence of Q waves. She was advised if her symptoms persist she is to follow-up with her PCP. ER if symptoms change or worsen. Red flag symptoms were discussed with patient. Patient verbalized understanding and is agreeable plan of care. All questions were answered. Assessment/Plan   Marvin Ayala was seen today for shortness of breath, headache and cough. Diagnoses and all orders for this visit:    Shortness of breath  -     EKG 12 lead;  Future  -     EKG 12 lead  -     D-DIMER, QUANTITATIVE; Future    Palpitations  -     EKG 12 lead; Future  -     EKG 12 lead  -     CBC Auto Differential; Future  -     Comprehensive Metabolic Panel; Future  -     TSH without Reflex; Future  -     TROPONIN I; Future  -     IRON AND TIBC; Future    Iron deficiency anemia, unspecified iron deficiency anemia type  -     CBC Auto Differential; Future  -     IRON AND TIBC; Future    Exposure to confirmed case of COVID-19  -     POCT COVID-19, Antigen  -     COVID-19; Future    Return if symptoms worsen or fail to improve. Electronically signed by MIGUELINA Lafleur CNP   DD: 8/17/21    **This report was transcribed using voice recognition software. Every effort was made to ensure accuracy; however, inadvertent computerized transcription errors may be present.

## 2021-10-08 ENCOUNTER — APPOINTMENT (OUTPATIENT)
Dept: GENERAL RADIOLOGY | Age: 36
End: 2021-10-08
Payer: COMMERCIAL

## 2021-10-08 ENCOUNTER — HOSPITAL ENCOUNTER (EMERGENCY)
Age: 36
Discharge: HOME | End: 2021-10-08
Attending: STUDENT IN AN ORGANIZED HEALTH CARE EDUCATION/TRAINING PROGRAM
Payer: COMMERCIAL

## 2021-10-08 ENCOUNTER — APPOINTMENT (OUTPATIENT)
Dept: CT IMAGING | Age: 36
End: 2021-10-08
Payer: COMMERCIAL

## 2021-10-08 VITALS
HEIGHT: 67 IN | DIASTOLIC BLOOD PRESSURE: 72 MMHG | HEART RATE: 67 BPM | SYSTOLIC BLOOD PRESSURE: 142 MMHG | TEMPERATURE: 97.8 F | WEIGHT: 196 LBS | OXYGEN SATURATION: 98 % | RESPIRATION RATE: 16 BRPM | BODY MASS INDEX: 30.76 KG/M2

## 2021-10-08 DIAGNOSIS — R42 VERTIGO: Primary | ICD-10-CM

## 2021-10-08 DIAGNOSIS — H81.11 BENIGN PAROXYSMAL POSITIONAL VERTIGO OF RIGHT EAR: ICD-10-CM

## 2021-10-08 DIAGNOSIS — R00.2 PALPITATIONS: ICD-10-CM

## 2021-10-08 LAB
ANION GAP SERPL CALCULATED.3IONS-SCNC: 9 MMOL/L (ref 7–16)
BACTERIA: ABNORMAL /HPF
BASOPHILS ABSOLUTE: 0.03 E9/L (ref 0–0.2)
BASOPHILS RELATIVE PERCENT: 0.4 % (ref 0–2)
BILIRUBIN URINE: NEGATIVE
BLOOD, URINE: NORMAL
BUN BLDV-MCNC: 10 MG/DL (ref 6–20)
CALCIUM SERPL-MCNC: 9.3 MG/DL (ref 8.6–10.2)
CHLORIDE BLD-SCNC: 105 MMOL/L (ref 98–107)
CLARITY: CLEAR
CO2: 24 MMOL/L (ref 22–29)
COLOR: YELLOW
CREAT SERPL-MCNC: 0.8 MG/DL (ref 0.5–1)
EKG ATRIAL RATE: 63 BPM
EKG P AXIS: 37 DEGREES
EKG P-R INTERVAL: 154 MS
EKG Q-T INTERVAL: 392 MS
EKG QRS DURATION: 94 MS
EKG QTC CALCULATION (BAZETT): 401 MS
EKG R AXIS: 27 DEGREES
EKG T AXIS: 35 DEGREES
EKG VENTRICULAR RATE: 63 BPM
EOSINOPHILS ABSOLUTE: 0.16 E9/L (ref 0.05–0.5)
EOSINOPHILS RELATIVE PERCENT: 2.3 % (ref 0–6)
GFR AFRICAN AMERICAN: >60
GFR NON-AFRICAN AMERICAN: >60 ML/MIN/1.73
GLUCOSE BLD-MCNC: 87 MG/DL (ref 74–99)
GLUCOSE URINE: NEGATIVE MG/DL
HCG(URINE) PREGNANCY TEST: NEGATIVE
HCT VFR BLD CALC: 38.2 % (ref 34–48)
HEMOGLOBIN: 12.9 G/DL (ref 11.5–15.5)
IMMATURE GRANULOCYTES #: 0.01 E9/L
IMMATURE GRANULOCYTES %: 0.1 % (ref 0–5)
KETONES, URINE: NEGATIVE MG/DL
LEUKOCYTE ESTERASE, URINE: NEGATIVE
LYMPHOCYTES ABSOLUTE: 2.05 E9/L (ref 1.5–4)
LYMPHOCYTES RELATIVE PERCENT: 29.2 % (ref 20–42)
MAGNESIUM: 1.7 MG/DL (ref 1.6–2.6)
MCH RBC QN AUTO: 29.5 PG (ref 26–35)
MCHC RBC AUTO-ENTMCNC: 33.8 % (ref 32–34.5)
MCV RBC AUTO: 87.2 FL (ref 80–99.9)
MONOCYTES ABSOLUTE: 0.44 E9/L (ref 0.1–0.95)
MONOCYTES RELATIVE PERCENT: 6.3 % (ref 2–12)
NEUTROPHILS ABSOLUTE: 4.34 E9/L (ref 1.8–7.3)
NEUTROPHILS RELATIVE PERCENT: 61.7 % (ref 43–80)
NITRITE, URINE: NEGATIVE
PDW BLD-RTO: 14.3 FL (ref 11.5–15)
PH UA: 5.5 (ref 5–9)
PLATELET # BLD: 255 E9/L (ref 130–450)
PMV BLD AUTO: 12 FL (ref 7–12)
POTASSIUM REFLEX MAGNESIUM: 3.8 MMOL/L (ref 3.5–5)
PRO-BNP: 123 PG/ML (ref 0–125)
PROTEIN UA: NEGATIVE MG/DL
RBC # BLD: 4.38 E12/L (ref 3.5–5.5)
RBC UA: ABNORMAL /HPF (ref 0–2)
SODIUM BLD-SCNC: 138 MMOL/L (ref 132–146)
SPECIFIC GRAVITY UA: <=1.005 (ref 1–1.03)
TROPONIN, HIGH SENSITIVITY: <6 NG/L (ref 0–9)
UROBILINOGEN, URINE: 0.2 E.U./DL
WBC # BLD: 7 E9/L (ref 4.5–11.5)
WBC UA: ABNORMAL /HPF (ref 0–5)

## 2021-10-08 PROCEDURE — 93005 ELECTROCARDIOGRAM TRACING: CPT | Performed by: STUDENT IN AN ORGANIZED HEALTH CARE EDUCATION/TRAINING PROGRAM

## 2021-10-08 PROCEDURE — 81025 URINE PREGNANCY TEST: CPT

## 2021-10-08 PROCEDURE — 99283 EMERGENCY DEPT VISIT LOW MDM: CPT

## 2021-10-08 PROCEDURE — 85025 COMPLETE CBC W/AUTO DIFF WBC: CPT

## 2021-10-08 PROCEDURE — 36415 COLL VENOUS BLD VENIPUNCTURE: CPT

## 2021-10-08 PROCEDURE — 71045 X-RAY EXAM CHEST 1 VIEW: CPT

## 2021-10-08 PROCEDURE — 70450 CT HEAD/BRAIN W/O DYE: CPT

## 2021-10-08 PROCEDURE — 84484 ASSAY OF TROPONIN QUANT: CPT

## 2021-10-08 PROCEDURE — 93010 ELECTROCARDIOGRAM REPORT: CPT | Performed by: INTERNAL MEDICINE

## 2021-10-08 PROCEDURE — 81001 URINALYSIS AUTO W/SCOPE: CPT

## 2021-10-08 PROCEDURE — 80048 BASIC METABOLIC PNL TOTAL CA: CPT

## 2021-10-08 PROCEDURE — 83880 ASSAY OF NATRIURETIC PEPTIDE: CPT

## 2021-10-08 PROCEDURE — 83735 ASSAY OF MAGNESIUM: CPT

## 2021-10-08 RX ORDER — ALBUTEROL SULFATE 0.63 MG/3ML
1 SOLUTION RESPIRATORY (INHALATION) EVERY 6 HOURS PRN
COMMUNITY

## 2021-10-08 NOTE — ED NOTES
Iv established and labs drawn/sent, urine sent, ekg done, pt on cardiac monitor with cont pulse ox, bed low/locked with side rails up and call light within reach, resp easy and nonlabored, nsr on monitor     Eli Palacio RN  10/08/21 [id number]

## 2021-10-08 NOTE — ED PROVIDER NOTES
ED  Provider Note  Admit Date/RoomTime: 10/8/2021 12:50 PM  ED Room: 15[facility]      History of Present Illness:  10/8/21, Time: 1:26 PM EDT  Chief Complaint   Patient presents with    Shortness of Breath     started on wed    Palpitations     x 3 weeks    Dizziness     x 3 weeks with headaches and seen pcp on monday and schedule to see neurologist          Estela Strauss is a 43 y.o. female presenting to the ED for 3 weeks of dizziness, palpitations, SOB. Intermittent. Dizziness on head position change/tilt, especially to R side. No other trigger. Dizziness is severe, associated with nausea no vomiting. Palpiations also intermittent and 3 week duration. JAMES started on Wednesday. Patient has no personal history of DVT/PE, no unilateral leg swelling or edema/erythema, no recent surgeries or immobilizations, no active cancer. No leg pain. No falls or trauma. No neck pain or stiffness. No CP, arm/jaw/back pain. No weakness numbness ataxia or speech difficulty, word findings, or facial droop. Seen by PCP who scheduled CT head but patient not able to get performed so requests one today. No tachycardia or tachypnea here in ED, stable 98% on RA. HA mild, non maximal at onset, intermittent, no vision changes, no neck pain or stiffness. No back pain. Review of Systems:   A complete review of systems was performed and pertinent positives and negatives are stated within HPI, all other systems reviewed and are negative.        --------------------------------------------- PATIENT HISTORY--------------------------------------------  Past Medical History:  has a past medical history of Anemia, GI (gastrointestinal bleed), History of blood transfusion, and Ovarian cyst.    Past Surgical History:  has a past surgical history that includes Endoscopy, colon, diagnostic (02/01/2018); cyst removal (Left);  Colonoscopy; pr hysteroscopy,w/endometrial ablation (N/A, 7/18/2018); and Upper gastrointestinal endoscopy (N/A, 10/14/2020). Family History: family history is not on file. . Unless otherwise noted, family history is non contributory    Social History:  reports that she has been smoking cigarettes. She has a 10.00 pack-year smoking history. She has never used smokeless tobacco. She reports that she does not drink alcohol and does not use drugs. The patients home medications have been reviewed. Allergies: Sulfa antibiotics    I have reviewed the past medical history, past surgical history, social history, and family history    ---------------------------------------------------PHYSICAL EXAM--------------------------------------    Constitutional/General: Alert and oriented x3  Head: Normocephalic and atraumatic  Eyes: PERRL, EOMI, sclera non icteric  ENT: Oropharynx clear, handling secretions, no trismus  Neck: Supple, full ROM, no stridor, no meningismus  Respiratory: LCTAB  Cardiovascular: RRR, no R/G/M, 2+ peripheral pulses  Chest: No chest wall tenderness, equal chest rise  Gastrointestinal:  Soft, notender  Musculoskeletal: Extremities warm and well perfused, moving all extremities  Skin: skin warm and dry. No rashes.    Neurologic: CN II-XII intact   No facial droop/asymmetry   No dysarthria   No aphasia  No dysmetria on finger-to-nose testing   RUE: 5/5 strength shoulder abduction and abduction, 5/5 elbow flexion and extension, 5/5 wrist flexion and extension, sensation intact  LUE: 5/5 strength shoulder abduction and abduction, 5/5 elbow flexion and extension, 5/5 wrist flexion and extension, sensation intact  RLE: 5 out of 5 strength with straight leg raise, 5 out of 5 strength knee flexion and extension, 5 out of 5 strength ankle dorsiflexion and plantarflexion, sensation intact  LLE: 5 out of 5 strength with straight leg raise, 5 out of 5 strength knee flexion and extension, 5 out of 5 strength ankle dorsiflexion and plantarflexion, sensation intact  Psychiatric: Normal Affect, behavior normal    [place]' Criteria for PE (possible score 0 to 12.5)       Clinical Signs & Symptoms of DVT   No      PE is #1 Dx or Equally Likely   No      Heart Rate >100   No      Immobillization at least 3 days or     Surgery in past 4 Weeks   No      Previous Diagnosed PE or DVT   No      Hemoptysis   No      Malignancy w/Tx in Past 6 Months or     Palliative Tx   No      TOTAL SCORE   0     Low Risk Group: 0-1.5 =  1.3-3 % incidence of PE  Mod Risk Group: 2-3.5 =  16.2 % Incidence of PE  Mod Risk Group: > 4 = 28% Incidence of PE  High Risk Group >5 = 40.6 Incidence of         -------------------------------------------------- RESULTS -------------------------------------------------  I have personally reviewed all laboratory and imaging results for this patient. Results are listed below.      LABS: (Lab results interpreted by me)  Results for orders placed or performed during the hospital encounter of 10/08/21   CBC Auto Differential   Result Value Ref Range    WBC 7.0 4.5 - 11.5 E9/L    RBC 4.38 3.50 - 5.50 E12/L    Hemoglobin 12.9 11.5 - 15.5 g/dL    Hematocrit 38.2 34.0 - 48.0 %    MCV 87.2 80.0 - 99.9 fL    MCH 29.5 26.0 - 35.0 pg    MCHC 33.8 32.0 - 34.5 %    RDW 14.3 11.5 - 15.0 fL    Platelets 103 516 - 718 E9/L    MPV 12.0 7.0 - 12.0 fL    Neutrophils % 61.7 43.0 - 80.0 %    Immature Granulocytes % 0.1 0.0 - 5.0 %    Lymphocytes % 29.2 20.0 - 42.0 %    Monocytes % 6.3 2.0 - 12.0 %    Eosinophils % 2.3 0.0 - 6.0 %    Basophils % 0.4 0.0 - 2.0 %    Neutrophils Absolute 4.34 1.80 - 7.30 E9/L    Immature Granulocytes # 0.01 E9/L    Lymphocytes Absolute 2.05 1.50 - 4.00 E9/L    Monocytes Absolute 0.44 0.10 - 0.95 E9/L    Eosinophils Absolute 0.16 0.05 - 0.50 E9/L    Basophils Absolute 0.03 0.00 - 0.20 M1/R   Basic Metabolic Panel w/ Reflex to MG   Result Value Ref Range    Sodium 138 132 - 146 mmol/L    Potassium reflex Magnesium 3.8 3.5 - 5.0 mmol/L    Chloride 105 98 - 107 mmol/L    CO2 24 22 - 29 mmol/L    Anion Gap 9 7 - 16 mmol/L    Glucose 87 74 - 99 mg/dL    BUN 10 6 - 20 mg/dL    CREATININE 0.8 0.5 - 1.0 mg/dL    GFR Non-African American >60 >=60 mL/min/1.73    GFR African American >60     Calcium 9.3 8.6 - 10.2 mg/dL   Troponin   Result Value Ref Range    Troponin, High Sensitivity <6 0 - 9 ng/L   Brain Natriuretic Peptide   Result Value Ref Range    Pro- 0 - 125 pg/mL   Pregnancy, Urine   Result Value Ref Range    HCG(Urine) Pregnancy Test NEGATIVE NEGATIVE   Urinalysis, reflex to microscopic   Result Value Ref Range    Color, UA Yellow Straw/Yellow    Clarity, UA Clear Clear    Glucose, Ur Negative Negative mg/dL    Bilirubin Urine Negative Negative    Ketones, Urine Negative Negative mg/dL    Specific Gravity, UA <=1.005 1.005 - 1.030    Blood, Urine TRACE-INTACT Negative    pH, UA 5.5 5.0 - 9.0    Protein, UA Negative Negative mg/dL    Urobilinogen, Urine 0.2 <2.0 E.U./dL    Nitrite, Urine Negative Negative    Leukocyte Esterase, Urine Negative Negative   Microscopic Urinalysis   Result Value Ref Range    WBC, UA NONE 0 - 5 /HPF    RBC, UA 0-1 0 - 2 /HPF    Bacteria, UA RARE (A) None Seen /HPF   Magnesium   Result Value Ref Range    Magnesium 1.7 1.6 - 2.6 mg/dL   EKG 12 Lead   Result Value Ref Range    Ventricular Rate 63 BPM    Atrial Rate 63 BPM    P-R Interval 154 ms    QRS Duration 94 ms    Q-T Interval 392 ms    QTc Calculation (Bazett) 401 ms    P Axis 37 degrees    R Axis 27 degrees    T Axis 35 degrees   ,       RADIOLOGY:  Imaging interpreted by Radiologist unless otherwise specified  CT Head WO Contrast   Final Result   No acute intracranial abnormality. XR CHEST PORTABLE   Final Result   1. No acute cardiopulmonary disease identified. 2.  Thoracic dextroscoliosis.                Date of EKG: 10/8/21    Rhythm: sinus arrhythmia  Rate: normal  Axis: normal  Conduction: normal  ST Segments: normal  T Waves: normal    Clinical Impression: sinus arrhythmia  Comparison to prior EKG: no previous EKG and changes compared to previous EKG        ------------------------- NURSING NOTES AND VITALS REVIEWED ---------------------------  The nursing notes within the ED encounter and vital signs as below have been reviewed by myself  BP (!) 142/72   Pulse 67   Temp 97.8 °F (36.6 °C) (Oral)   Resp 16   Ht 5' 7\" (1.702 m)   Wt 196 lb (88.9 kg)   LMP 09/18/2021   SpO2 98%   BMI 30.70 kg/m²      The patients available past medical records and past encounters were reviewed. ------------------------------ ED COURSE/MEDICAL DECISION MAKING----------------------  Medications - No data to display    I, Dr. Emili Blanco, am the primary provider of record    Medical Decision Making:   Vertigo likely representing BPPV. Nonfocal exam as above. Low risk wells and PERC negative so doubt PE. Doubt CVA/TIA from symptoms and exam. Will perform CT that patient is already scheduled for. Epley unsuccessful in ED, but did exacerbate sx when turning to R.     ED Counseling: This emergency provider has spoken with the patient and any family present to discuss clinical status, results, plan of care, diagnosis and prognosis as able to be determined at this time. Any questions were answered and patient and/or family/POA are agreeable with the plan.       --------------------------------- IMPRESSION AND DISPOSITION ---------------------------------    IMPRESSION  1. Vertigo    2. Benign paroxysmal positional vertigo of right ear    3. Palpitations        DISPOSITION  Disposition: Discharge to home  Patient condition is good      This report was transcribed using voice recognition software. Every effort was made to ensure accuracy; however, transcription errors may be present.          Adria Nelson MD  10/08/21 810 GRACE Ruffin MD  10/08/21 [id number]

## 2021-10-19 ENCOUNTER — OFFICE VISIT (OUTPATIENT)
Dept: NEUROLOGY | Age: 36
End: 2021-10-19
Payer: COMMERCIAL

## 2021-10-19 VITALS
TEMPERATURE: 98 F | HEART RATE: 73 BPM | WEIGHT: 191 LBS | SYSTOLIC BLOOD PRESSURE: 139 MMHG | OXYGEN SATURATION: 96 % | DIASTOLIC BLOOD PRESSURE: 85 MMHG | HEIGHT: 67 IN | BODY MASS INDEX: 29.98 KG/M2

## 2021-10-19 DIAGNOSIS — H81.11 BENIGN PAROXYSMAL POSITIONAL VERTIGO OF RIGHT EAR: ICD-10-CM

## 2021-10-19 DIAGNOSIS — G44.229 CHRONIC TENSION-TYPE HEADACHE, NOT INTRACTABLE: Primary | ICD-10-CM

## 2021-10-19 DIAGNOSIS — R00.2 HEART PALPITATIONS: ICD-10-CM

## 2021-10-19 PROCEDURE — G8417 CALC BMI ABV UP PARAM F/U: HCPCS | Performed by: PHYSICIAN ASSISTANT

## 2021-10-19 PROCEDURE — G8427 DOCREV CUR MEDS BY ELIG CLIN: HCPCS | Performed by: PHYSICIAN ASSISTANT

## 2021-10-19 PROCEDURE — G8484 FLU IMMUNIZE NO ADMIN: HCPCS | Performed by: PHYSICIAN ASSISTANT

## 2021-10-19 PROCEDURE — 4004F PT TOBACCO SCREEN RCVD TLK: CPT | Performed by: PHYSICIAN ASSISTANT

## 2021-10-19 PROCEDURE — 99204 OFFICE O/P NEW MOD 45 MIN: CPT | Performed by: PHYSICIAN ASSISTANT

## 2021-10-19 RX ORDER — PROPRANOLOL HCL 60 MG
60 CAPSULE, EXTENDED RELEASE 24HR ORAL DAILY
Qty: 30 CAPSULE | Refills: 0 | Status: SHIPPED
Start: 2021-10-19 | End: 2021-11-16 | Stop reason: DRUGHIGH

## 2021-10-19 RX ORDER — MECLIZINE HCL 12.5 MG/1
12.5 TABLET ORAL 3 TIMES DAILY PRN
Qty: 15 TABLET | Refills: 0 | Status: SHIPPED | OUTPATIENT
Start: 2021-10-19 | End: 2021-10-29

## 2021-10-19 NOTE — PROGRESS NOTES
[address] [facility]. Baldo Stratton M.D., F.A.C.P. Jb Benson, DNP, APRN, ACNS-BC  Prateek Allen. Jennifer Tagn, MSN, APRN-FNP-C  MEGHA Dave, PA-C  Bri German, MSN, APRN-FNP-C  [address], [facility]  [place], [address]  Phone: [phone]  Fax: [phone]       Antonia Castro is a 43 y.o. left handed female       Past Medical History:     Past Medical History:   Diagnosis Date    Anemia     GI (gastrointestinal bleed)     History of blood transfusion     Ovarian cyst    Scoliosis    Past Surgical History:       Past Surgical History:   Procedure Laterality Date    COLONOSCOPY      CYST REMOVAL Left     wrist    ENDOSCOPY, COLON, DIAGNOSTIC  02/01/2018    LA HYSTEROSCOPY,W/ENDOMETRIAL ABLATION N/A 7/18/2018    HYSTEROSCOPY, DILATATION AND CURETTAGE performed by Vel Cottrell MD at [address] [facility] N/A 10/14/2020    EGD BIOPSY performed by Dominic Gomez MD at [address] 1940:       Sulfa antibiotics    Medications:     Prior to Admission medications    Medication Sig Start Date End Date Taking? Authorizing Provider   propranolol (INDERAL LA) 60 MG extended release capsule Take 1 capsule by mouth daily 10/19/21  Yes HEMANT Yates   meclizine (ANTIVERT) 12.5 MG tablet Take 1 tablet by mouth 3 times daily as needed for Dizziness 10/19/21 10/29/21 Yes HEMANT Yates   albuterol (ACCUNEB) 0.63 MG/3ML nebulizer solution Take 1 ampule by nebulization every 6 hours as needed for Wheezing   Yes Historical Provider, MD       Social History:        reports that she has been smoking cigarettes. She has a 10.00 pack-year smoking history. She has never used smokeless tobacco. She reports that she does not drink alcohol and does not use drugs. Sleeps 6 hours per night-- wakes up once. Does not feel rested.      Does state she is an anxious \"high strung\" person, but denies any changes in her day-to-day life or stress levels. She works as a drug and alcohol counselor. Review of Systems:     No chest pain or palpitations  No SOB  No lightheadedness or loss of consciousness  No falls, tripping or stumbling  No incontinence of bowels or bladder  No itching or bruising appreciated  No numbness, focal arm/leg weakness   + headache  + \"dizziness\"  + off balanced  + tingling of hands   + concentration/ words get \"jumbled\"     ROS is otherwise negative    Family History:     Unknown- adopted     History of Present Illness:     Patient was referred for additional evaluation and management of multiple symptoms including headache, dizziness, confusion, balance difficulties and trouble getting words out. She presents alone and is a good historian. She reports that her symptoms began 5 weeks ago. Leading up to this she was in her normal state of health and denies any changes in her medications, day-to-day activity, stress levels or sleep. She started having daily, constant holocephalic headaches that are described as pressure, throbbing, sharp stabbing pain or dull ache. She states it just depends on the day. She reports waking up and going to bed with a headache. Bending over and movements exacerbate the headaches. She denied any light sensitivity and sound sensitivity nausea or vomiting. She denies any visual symptoms but does report some blurred vision when she is trying to read in the presence of a headache. She takes Tylenol about 3 times per week. She does not feel that they help. During this last 5 weeks she has also been feeling dizzy. She describes this as a spinning sensations when turning her head to the right or laying back too quickly. If she sits still the symptoms go away. She was seen in the ED on 10/8 and diagnosed with BPPV. She reports having history of vertigo in the past that was much more severe.   Meclizine at that time did not work for her, but she feels that since her symptoms are more strength  5/5   XII: tongue strength  Normal     Motor:  5/5 throughout  Normal bulk and tone  No drift   No abnormal movements    Sensory:  LT  normal  Vibration normal    Coordination:   FN, FFM and MALCOLM normal  HS normal    Gait:  Normal  Romberg's negative  Walks well on toes, heels, and tandem    DTR:   +2 throughout     No Gilbert's    No other pathological reflexes    Laboratory/Radiology:  ry/Radiology:     CBC with Differential:    Lab Results   Component Value Date    WBC 7.0 10/08/2021    RBC 4.38 10/08/2021    HGB 12.9 10/08/2021    HCT 38.2 10/08/2021     10/08/2021    MCV 87.2 10/08/2021    MCH 29.5 10/08/2021    MCHC 33.8 10/08/2021    RDW 14.3 10/08/2021    SEGSPCT 86 06/14/2013    LYMPHOPCT 29.2 10/08/2021    MONOPCT 6.3 10/08/2021    BASOPCT 0.4 10/08/2021    MONOSABS 0.44 10/08/2021    LYMPHSABS 2.05 10/08/2021    EOSABS 0.16 10/08/2021    BASOSABS 0.03 10/08/2021     CMP:    Lab Results   Component Value Date     10/08/2021    K 3.8 10/08/2021     10/08/2021    CO2 24 10/08/2021    BUN 10 10/08/2021    CREATININE 0.8 10/08/2021    GFRAA >60 10/08/2021    LABGLOM >60 10/08/2021    GLUCOSE 87 10/08/2021    GLUCOSE 92 02/11/2011    PROT 7.7 08/17/2021    LABALBU 4.5 08/17/2021    CALCIUM 9.3 10/08/2021    BILITOT 0.3 08/17/2021    ALKPHOS 42 08/17/2021    AST 22 08/17/2021    ALT 40 08/17/2021     CT head- unrevealing     All labs and images were personally reviewed at the time of this visit    Assessment:     43year old woman with c/o headaches, vertigo, confusion, heart palpitations, tingling in the hands     Headaches sound to be chronic tension type headaches-- likely worsened by stressors, inadequate sleep, skipping meals    Will start inderal 60 mg LA daily for prevention    Continue OTC analgesics for abortive therapy for now-- limit use to no more than 2-3 x per  Week   She reports her PCP has ordered an MRI of the brain- will follow up with these results once  completed

## 2021-10-21 ENCOUNTER — TELEPHONE (OUTPATIENT)
Dept: ADMINISTRATIVE | Age: 36
End: 2021-10-21

## 2021-10-21 NOTE — TELEPHONE ENCOUNTER
Patient Appointment Form:      PCP: Dhruv Newberry  Referring: Dhruv Newberry    Has the Patient:    Seen a Cardiologist? no    Had a heart catheterization? no    Had heart surgery? no    Had a stress test or nuclear stress test? no    Had an echocardiogram? no    Had a vascular ultrasound? no    Had a 24/48 heart monitor or extended cardiac event monitor? no    Had recent blood work in the last 6 months? yes    date: 10/8/21    ordering physician: Dhruv Newberry    Had a pacemaker/ICD/ILR implant? no    Seen an Electrophysiologist? no        Will send records via: in [address] [place] [address]      Date & time of appointment:  [email or web address]

## 2021-11-04 ENCOUNTER — OFFICE VISIT (OUTPATIENT)
Dept: CARDIOLOGY CLINIC | Age: 36
End: 2021-11-04
Payer: COMMERCIAL

## 2021-11-04 VITALS
HEIGHT: 67 IN | BODY MASS INDEX: 30.76 KG/M2 | RESPIRATION RATE: 18 BRPM | SYSTOLIC BLOOD PRESSURE: 128 MMHG | WEIGHT: 196 LBS | DIASTOLIC BLOOD PRESSURE: 82 MMHG | HEART RATE: 75 BPM

## 2021-11-04 DIAGNOSIS — R00.2 PALPITATIONS: Primary | ICD-10-CM

## 2021-11-04 DIAGNOSIS — R00.1 BRADYCARDIA: ICD-10-CM

## 2021-11-04 DIAGNOSIS — R06.09 DYSPNEA ON EXERTION: ICD-10-CM

## 2021-11-04 PROCEDURE — 93000 ELECTROCARDIOGRAM COMPLETE: CPT | Performed by: INTERNAL MEDICINE

## 2021-11-04 PROCEDURE — G8427 DOCREV CUR MEDS BY ELIG CLIN: HCPCS | Performed by: INTERNAL MEDICINE

## 2021-11-04 PROCEDURE — G8417 CALC BMI ABV UP PARAM F/U: HCPCS | Performed by: INTERNAL MEDICINE

## 2021-11-04 PROCEDURE — 99244 OFF/OP CNSLTJ NEW/EST MOD 40: CPT | Performed by: INTERNAL MEDICINE

## 2021-11-04 PROCEDURE — G8484 FLU IMMUNIZE NO ADMIN: HCPCS | Performed by: INTERNAL MEDICINE

## 2021-11-04 NOTE — PROGRESS NOTES
Patient Active Problem List   Diagnosis    Anemia    PUD (peptic ulcer disease)    Morbid obesity with BMI of 40.0-44.9, adult ([facility] [address]    Dysfunctional uterine bleeding    GI bleed    Iron deficiency anemia, unspecified       Current Outpatient Medications   Medication Sig Dispense Refill    propranolol (INDERAL LA) 60 MG extended release capsule Take 1 capsule by mouth daily 30 capsule 0    albuterol (ACCUNEB) 0.63 MG/3ML nebulizer solution Take 1 ampule by nebulization every 6 hours as needed for Wheezing       No current facility-administered medications for this visit. CC:    Patient is seen in Initial Evaluation for:  1. Palpitations    2. Bradycardia    3. Dyspnea on exertion        HPI:  Patient is seen in evaluation for several issues. She does have problems with palpitations. She notes these occur at rest.  She has no associated lightheadedness or dizziness. These are intermittent and she knows of no precipitating event. Additionally, she has had a low heart rate. She records her pulse rate with her wrist monitor and notes that her pulse runs 48 to 52 bpm overnight. Of note is that she was started on Inderal a few weeks ago for tension headaches. Further she does note difficulties with shortness of breath. Relates she has a hard time getting a deep breath.       ROS:   General: No unusual weight gain, no change in exercise tolerance  Skin: No rash or itching  EENT: No vision changes or nosebleeds  Cardiovascular: No orthopnea or paroxysmal nocturnal dyspnea  Respiratory: No cough or hemoptysis  Gastrointestinal: No hematemesis or recent changes in bowel habits  Genitourinary: No hematuria, urgency or frequency  Musculoskeletal: No muscular weakness or joint swelling   Neurologic / Psychiatric: No incoordination or convulsions  Allergic / Immunologic/ Lymphatic / Endocrine: No anemia or bleeding tendency    Social History     Socioeconomic History    Marital status: Single     Spouse name: Not on file    Number of children: Not on file    Years of education: Not on file    Highest education level: Not on file   Occupational History    Not on file   Tobacco Use    Smoking status: Current Every Day Smoker     Packs/day: 1.00     Years: 10.00     Pack years: 10.00     Types: Cigarettes    Smokeless tobacco: Never Used   Vaping Use    Vaping Use: Never used   Substance and Sexual Activity    Alcohol use: No    Drug use: No    Sexual activity: Yes     Partners: Male   Other Topics Concern    Not on file   Social History Narrative    Not on file     Social Determinants of Health     Financial Resource Strain:     Difficulty of Paying Living Expenses:    Food Insecurity:     Worried About Running Out of Food in the Last Year:     [address] in the Last Year:    Transportation Needs:     Lack of Transportation (Medical):  Lack of Transportation (Non-Medical):    Physical Activity:     Days of Exercise per Week:     Minutes of Exercise per Session:    Stress:     Feeling of Stress :    Social Connections:     Frequency of Communication with Friends and Family:     Frequency of Social Gatherings with Friends and Family:     Attends [employer] Services:     Active Member of Clubs or Organizations:     Attends Club or Organization Meetings:     Marital Status:    Intimate Partner Violence:     Fear of Current or Ex-Partner:     Emotionally Abused:     Physically Abused:     Sexually Abused:        History reviewed. No pertinent family history. Past Medical History:   Diagnosis Date    Anemia     GI (gastrointestinal bleed)     History of blood transfusion     Ovarian cyst    History of hemoglobin of 5 secondary to bleeding ulcers. PHYSICAL EXAM:  CONSTITUTIONAL:  Well developed, well nourished    Vitals:    11/04/21 1257   BP: 128/82   Pulse: 75   Resp: 18   Weight: 196 lb (88.9 kg)   Height: 5' 7\" (1.702 m)     HEAD & FACE: Normocephalic. Symmetric.   EYES: No xanthelasma. Conjunctivae not injected. EARS, NOSE, MOUTH & THROAT: Good dentition. No oral pallor or cyanosis. NECK: No JVD at 30 degrees. No thyromegaly. RESPIRATORY: Clear to auscultation and percussion in all fields. No use of accessory muscle or intercostal retractions. CARDIOVASCULAR: Regular rate and rhythm. No lifts or thrills on palpitation. Auscultation with normal S1-S2 in intensity and splitting. No carotid bruits. Abdominal aorta not enlarged. Femoral arteries without bruits. No edema. ABDOMEN: Soft without hepatic or splenic enlargement. No tenderness. MUSCULOSKELETAL: No kyphosis or scoliosis of the back. Good muscle strength and tone. No muscle atrophy. Normal gait and ability to undergo exercise stress testing. EXTREMITIES: No clubbing or cyanosis. SKIN: No Xanthomas or ulcerations. NEUROLOGIC: Oriented to time, place and person. Normal mood and affect. LYMPHATIC:  No palpable neck or supraclavicular nodes. No splenomegaly. EKG: the EKG tracing was reviewed and found to reveal: Normal sinus rhythm. QTc 402No change compared to prior tracing. ASSESSMENT:                                                     ORDERS:       Diagnosis Orders   1. Palpitations  EKG 12 lead   2. Bradycardia  Holter Monitor 48 Hour   3. Dyspnea on exertion  ECHO COMPLETE     Above assessment cardiac issues stable. PLAN:   See above orders. Medication reconciliation completed. Old records were reviewed and found to reveal:   Discussed issues that would prompt earlier evaluation. Same cardiac medications. Follow-up office visit - pending above. May require stress testing.

## 2021-11-05 ENCOUNTER — OFFICE VISIT (OUTPATIENT)
Dept: PRIMARY CARE CLINIC | Age: 36
End: 2021-11-05
Payer: COMMERCIAL

## 2021-11-05 VITALS
WEIGHT: 195 LBS | DIASTOLIC BLOOD PRESSURE: 74 MMHG | BODY MASS INDEX: 30.61 KG/M2 | TEMPERATURE: 97.7 F | SYSTOLIC BLOOD PRESSURE: 120 MMHG | HEIGHT: 67 IN

## 2021-11-05 DIAGNOSIS — J02.9 PHARYNGITIS, UNSPECIFIED ETIOLOGY: Primary | ICD-10-CM

## 2021-11-05 LAB — S PYO AG THROAT QL: NORMAL

## 2021-11-05 PROCEDURE — G8484 FLU IMMUNIZE NO ADMIN: HCPCS | Performed by: NURSE PRACTITIONER

## 2021-11-05 PROCEDURE — 99213 OFFICE O/P EST LOW 20 MIN: CPT | Performed by: NURSE PRACTITIONER

## 2021-11-05 PROCEDURE — 87880 STREP A ASSAY W/OPTIC: CPT | Performed by: NURSE PRACTITIONER

## 2021-11-05 PROCEDURE — G8417 CALC BMI ABV UP PARAM F/U: HCPCS | Performed by: NURSE PRACTITIONER

## 2021-11-05 PROCEDURE — 4004F PT TOBACCO SCREEN RCVD TLK: CPT | Performed by: NURSE PRACTITIONER

## 2021-11-05 PROCEDURE — G8427 DOCREV CUR MEDS BY ELIG CLIN: HCPCS | Performed by: NURSE PRACTITIONER

## 2021-11-05 RX ORDER — AMOXICILLIN 500 MG/1
500 CAPSULE ORAL 2 TIMES DAILY
Qty: 20 CAPSULE | Refills: 0 | Status: SHIPPED | OUTPATIENT
Start: 2021-11-05 | End: 2021-11-15

## 2021-11-05 NOTE — PROGRESS NOTES
Chief Complaint   Pharyngitis (x 2 days)      History of Present Illness   Source of history provided by:  patient. Barrington Espino is a 43 y.o. old female who has a past medical history of:   Past Medical History:   Diagnosis Date    Anemia     GI (gastrointestinal bleed)     History of blood transfusion     Ovarian cyst     Presents to the flu clinic with complaints of Fever and Pharyngitis x 2 days. States symptoms have stayed the same since onset. Has been taking tylenol without symptomatic relief. Denies any Cough, Shortness of breath, Nausea, Vomiting or Close contact with a lab confirmed COVID-19 patient within 14 days of symptom onset . Denies any hx of no history of pneumonia or bronchitis. Denies concern for covid. ROS   Pertinent positives and negatives are stated within HPI, all other systems reviewed and are negative. Surgical History:  has a past surgical history that includes Endoscopy, colon, diagnostic (02/01/2018); cyst removal (Left); Colonoscopy; pr hysteroscopy,w/endometrial ablation (N/A, 7/18/2018); and Upper gastrointestinal endoscopy (N/A, 10/14/2020). Social History:  reports that she has been smoking cigarettes. She has a 10.00 pack-year smoking history. She has never used smokeless tobacco. She reports that she does not drink alcohol and does not use drugs. Family History: family history is not on file. Allergies: Sulfa antibiotics    Physical Exam      VS:  /74   Temp 97.7 °F (36.5 °C)   Ht 5' 7\" (1.702 m)   Wt 195 lb (88.5 kg)   BMI 30.54 kg/m²    Oxygen Saturation Interpretation: Normal.    Constitutional:  Alert, development consistent with age. NAD. Head:  NC/NT. Airway patent. Ears: TMs wnl bilaterally. Canals without exudate or swelling bilaterally. Mouth: Posterior pharynx with mild erythema and clear postnasal drip. mild tonsillar hypertrophy or exudate. Neck:  Normal ROM. Supple. no anterior cervical adenopathy noted.   Lungs: CTAB without

## 2021-11-05 NOTE — PATIENT INSTRUCTIONS
Patient Education        Sore Throat: Care Instructions  Your Care Instructions     Infection by bacteria or a virus causes most sore throats. Cigarette smoke, dry air, air pollution, allergies, and yelling can also cause a sore throat. Sore throats can be painful and annoying. Fortunately, most sore throats go away on their own. If you have a bacterial infection, your doctor may prescribe antibiotics. Follow-up care is a key part of your treatment and safety. Be sure to make and go to all appointments, and call your doctor if you are having problems. It's also a good idea to know your test results and keep a list of the medicines you take. How can you care for yourself at home? · If your doctor prescribed antibiotics, take them as directed. Do not stop taking them just because you feel better. You need to take the full course of antibiotics. · Gargle with warm salt water once an hour to help reduce swelling and relieve discomfort. Use 1 teaspoon of salt mixed in 1 cup of warm water. · Take an over-the-counter pain medicine, such as acetaminophen (Tylenol), ibuprofen (Advil, Motrin), or naproxen (Aleve). Read and follow all instructions on the label. · Be careful when taking over-the-counter cold or flu medicines and Tylenol at the same time. Many of these medicines have acetaminophen, which is Tylenol. Read the labels to make sure that you are not taking more than the recommended dose. Too much acetaminophen (Tylenol) can be harmful. · Drink plenty of fluids. Fluids may help soothe an irritated throat. Hot fluids, such as tea or soup, may help decrease throat pain. · Use over-the-counter throat lozenges to soothe pain. Regular cough drops or hard candy may also help. These should not be given to young children because of the risk of choking. · Do not smoke or allow others to smoke around you. If you need help quitting, talk to your doctor about stop-smoking programs and medicines.  These can increase your chances of quitting for good. · Use a vaporizer or humidifier to add moisture to your bedroom. Follow the directions for cleaning the machine. When should you call for help? Call your doctor now or seek immediate medical care if:    · You have new or worse trouble swallowing.     · Your sore throat gets much worse on one side. Watch closely for changes in your health, and be sure to contact your doctor if you do not get better as expected. Where can you learn more? Go to https://Rentify.[employer]rex. org and sign in to your Ensa account. Enter S687 in the MySQUAR box to learn more about \"Sore Throat: Care Instructions. \"     If you do not have an account, please click on the \"Sign Up Now\" link. Current as of: December 2, 2020               Content Version: 13.0  © 8809-7484 Healthwise, Incorporated. Care instructions adapted under license by [facility] ([facility]). If you have questions about a medical condition or this instruction, always ask your healthcare professional. [place] [address] any warranty or liability for your use of this information.

## 2021-11-16 ENCOUNTER — OFFICE VISIT (OUTPATIENT)
Dept: NEUROLOGY | Age: 36
End: 2021-11-16
Payer: COMMERCIAL

## 2021-11-16 VITALS
OXYGEN SATURATION: 98 % | HEIGHT: 67 IN | HEART RATE: 64 BPM | TEMPERATURE: 97.7 F | WEIGHT: 190 LBS | BODY MASS INDEX: 29.82 KG/M2 | SYSTOLIC BLOOD PRESSURE: 134 MMHG | DIASTOLIC BLOOD PRESSURE: 93 MMHG

## 2021-11-16 DIAGNOSIS — G44.229 CHRONIC TENSION-TYPE HEADACHE, NOT INTRACTABLE: Primary | ICD-10-CM

## 2021-11-16 DIAGNOSIS — H81.11 BENIGN PAROXYSMAL POSITIONAL VERTIGO OF RIGHT EAR: ICD-10-CM

## 2021-11-16 PROCEDURE — 99214 OFFICE O/P EST MOD 30 MIN: CPT | Performed by: PHYSICIAN ASSISTANT

## 2021-11-16 PROCEDURE — G8484 FLU IMMUNIZE NO ADMIN: HCPCS | Performed by: PHYSICIAN ASSISTANT

## 2021-11-16 PROCEDURE — G8427 DOCREV CUR MEDS BY ELIG CLIN: HCPCS | Performed by: PHYSICIAN ASSISTANT

## 2021-11-16 PROCEDURE — 4004F PT TOBACCO SCREEN RCVD TLK: CPT | Performed by: PHYSICIAN ASSISTANT

## 2021-11-16 PROCEDURE — G8417 CALC BMI ABV UP PARAM F/U: HCPCS | Performed by: PHYSICIAN ASSISTANT

## 2021-11-16 RX ORDER — PROPRANOLOL HYDROCHLORIDE 80 MG/1
80 CAPSULE, EXTENDED RELEASE ORAL DAILY
Qty: 30 CAPSULE | Refills: 2 | Status: SHIPPED
Start: 2021-11-16 | End: 2022-03-03 | Stop reason: ALTCHOICE

## 2021-11-16 NOTE — PROGRESS NOTES
[address] [facility]. Desi Vivas M.D., F.A.C.P. Fuad Mayes, DNP, APRN, ACNS-BC  Dennie Britain. Mandi Ford, MSN, APRN-FNP-C  Daniel Esteves MSPAS, PA-C  Sunil Zepeda, MSN, APRN-FNP-C  [address], [address]  Phone: [phone]  Fax: [phone]       Reg Ch is a 43 y.o. left handed female     She presents for follow-up of multiple symptoms including headache, dizziness, confusion, balance difficulties and trouble getting words out. She presents alone and is a good historian. Since her last visit headaches have improved to 1 time per week with Inderal 60 mg daily for prevention. She uses Tylenol as abortive therapy which often does not work. She still reports spinning sensations when turning her head to the right or laying back too quickly. Meclizine helps, though she feels like she has had to take more of this lately. She has noticed a correlation with increased dizziness on days following taking Benadryl to help her sleep. She followed up with cardiology. She wore a 48-hour heart monitor and has not received the results. She also is planned to have an echocardiogram.    She still reports difficulty with concentration and getting words out. She continues to sleep okay. She denied snoring or waking up gasping for air. She does admit to daytime fatigue and taking naps. She denies any history of sleep apnea. MRI of the brain was completed which reported few nonspecific T2 hyperintensities. Otherwise MRI brain was unremarkable. Unfortunately I do not have the images to review. Overall, she feels that she is feeling better and has no additional concerns at today's visit.     No chest pain or palpitations  No SOB  No lightheadedness or loss of consciousness  No falls, tripping or stumbling  No incontinence of bowels or bladder  No itching or bruising appreciated  No numbness, focal arm/leg weakness   + headache  + \"dizziness\"  + off balanced  + tingling of hands   + concentration/ words get \"jumbled\"     ROS is otherwise negative    Allergies   Allergen Reactions    Sulfa Antibiotics Rash       Objective:       BP (!) 134/93 (Site: Left Upper Arm, Position: Sitting, Cuff Size: Medium Adult)   Pulse 64   Temp 97.7 °F (36.5 °C) (Infrared)   Ht 5' 7\" (1.702 m)   Wt 190 lb (86.2 kg)   SpO2 98%   BMI 29.76 kg/m²     General appearance: alert, appears stated age, cooperative and in no distress  Head: normocephalic, without obvious abnormality, atraumatic  Eyes: conjunctivae/corneas clear; no drainage  Neck: Supple  Lungs: Effort normal  Extremities: normal, atraumatic, no cyanosis or edema  Skin:  color, texture, turgor normal--no rashes or lesions      Mental Status: alert and oriented. Thought content appropriate.      Appropriate attention/concentration  Intact fundus of knowledge  Repetition intact  Memories intact    Speech: no dysarthria  Language: no aphasias    Cranial Nerves:  I: smell    II: visual acuity     II: visual fields Full    II: pupils MEAGHAN   III,VII: ptosis None   III,IV,VI: extraocular muscles  EOMI without nystagmus   V: mastication Normal   V: facial light touch sensation  Normal   V,VII: corneal reflex     VII: facial muscle function - upper  Normal   VII: facial muscle function - lower Normal   VIII: hearing Normal   IX: soft palate elevation  Normal   IX,X: gag reflex    XI: trapezius strength  5/5   XI: sternocleidomastoid strength 5/5   XI: neck extension strength  5/5   XII: tongue strength  Normal     Motor:  5/5 throughout  Normal bulk and tone  No drift   No abnormal movements    Sensory:  LT  normal    Coordination:   FN, FFM  normal  HS normal    Gait:  Normal    DTR:   +2 throughout     No Gilbert's    No other pathological reflexes    Laboratory/Radiology:  ry/Radiology:     CBC with Differential:    Lab Results   Component Value Date    WBC 7.0 10/08/2021    RBC 4.38 10/08/2021    HGB 12.9 10/08/2021    HCT 38.2 10/08/2021     10/08/2021    MCV 87.2 10/08/2021    MCH 29.5 10/08/2021    MCHC 33.8 10/08/2021    RDW 14.3 10/08/2021    SEGSPCT 86 06/14/2013    LYMPHOPCT 29.2 10/08/2021    MONOPCT 6.3 10/08/2021    BASOPCT 0.4 10/08/2021    MONOSABS 0.44 10/08/2021    LYMPHSABS 2.05 10/08/2021    EOSABS 0.16 10/08/2021    BASOSABS 0.03 10/08/2021     CMP:    Lab Results   Component Value Date     10/08/2021    K 3.8 10/08/2021     10/08/2021    CO2 24 10/08/2021    BUN 10 10/08/2021    CREATININE 0.8 10/08/2021    GFRAA >60 10/08/2021    LABGLOM >60 10/08/2021    GLUCOSE 87 10/08/2021    GLUCOSE 92 02/11/2011    PROT 7.7 08/17/2021    LABALBU 4.5 08/17/2021    CALCIUM 9.3 10/08/2021    BILITOT 0.3 08/17/2021    ALKPHOS 42 08/17/2021    AST 22 08/17/2021    ALT 40 08/17/2021     CT head- unrevealing     MRI brain report reviewed (no images for me to review today)-few nonspecific T2 hyperintensities, otherwise unremarkable    All labs and images were personally reviewed at the time of this visit    Assessment:     43year old woman with c/o headaches, vertigo, confusion, heart palpitations, tingling in the hands     Headaches sound to be chronic tension type headaches-- likely worsened by stressors, inadequate sleep, skipping meals    Good improvement on Inderal 60 mg daily, now with 1 headache per week. We will increase Inderal to 80 mg daily. She will continue to use over-the-counter analgesics for abortive therapy. She declines new prescription for abortive therapy at this time    BPPV R ear    Recommend at home exercises    Meclizine 12.5 mg TID prn    If does not improve, consider vestibular therapy    Advise discontinuing Benadryl as this may be worsening her symptoms as she has noticed  increased dizziness following taking Benadryl.       ? Consider sleep study for eval for MAEGAN   Denies snoring or waking gasping/choking for air-- but reports morning headaches, difficulty concentrating, daytime fatigue and taking naps   Plan:     Increase Inderal to 80 mg daily    Meclizine 12.5 mg TID prn --limit use, as prolonged use can delay recovery. If symptoms persist would recommend vestibular therapy     Home exercises for vertigo     Follow up with cardiology as planned     RTO 3 months or sooner prn     Call with questions or concerns     John Momin PA-C  8:40 AM  11/16/2021    I spent 20 minutes with this patient obtaining the HPI and discussing the exam with greater than 50% of the time providing counseling and education on medications and other treatment plans. All questions were answered prior to leaving my office.

## 2021-11-18 ENCOUNTER — TELEPHONE (OUTPATIENT)
Dept: CARDIOLOGY CLINIC | Age: 36
End: 2021-11-18

## 2021-11-18 NOTE — TELEPHONE ENCOUNTER
----- Message from oCncepción Wright MD sent at 11/18/2021 10:19 AM EST -----  Please let patient know that monitor looked okay. Having some innocent arrhythmias that do not require treatment. Slower heart rate probably secondary to Inderal therapy.

## 2021-12-14 ENCOUNTER — TELEPHONE (OUTPATIENT)
Dept: NEUROLOGY | Age: 36
End: 2021-12-14

## 2021-12-14 NOTE — TELEPHONE ENCOUNTER
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    Please Advise  Electronically signed by Sergio Nieves on 12/14/21 at 11:03 AM EST

## 2022-02-14 ENCOUNTER — OFFICE VISIT (OUTPATIENT)
Dept: NEUROLOGY | Age: 37
End: 2022-02-14
Payer: COMMERCIAL

## 2022-02-14 VITALS
HEART RATE: 80 BPM | DIASTOLIC BLOOD PRESSURE: 78 MMHG | WEIGHT: 193 LBS | HEIGHT: 67 IN | BODY MASS INDEX: 30.29 KG/M2 | SYSTOLIC BLOOD PRESSURE: 129 MMHG | OXYGEN SATURATION: 100 % | TEMPERATURE: 97.9 F

## 2022-02-14 DIAGNOSIS — H81.11 BENIGN PAROXYSMAL POSITIONAL VERTIGO OF RIGHT EAR: Primary | ICD-10-CM

## 2022-02-14 DIAGNOSIS — G44.229 CHRONIC TENSION-TYPE HEADACHE, NOT INTRACTABLE: ICD-10-CM

## 2022-02-14 PROCEDURE — G8417 CALC BMI ABV UP PARAM F/U: HCPCS | Performed by: PHYSICIAN ASSISTANT

## 2022-02-14 PROCEDURE — G8427 DOCREV CUR MEDS BY ELIG CLIN: HCPCS | Performed by: PHYSICIAN ASSISTANT

## 2022-02-14 PROCEDURE — G8484 FLU IMMUNIZE NO ADMIN: HCPCS | Performed by: PHYSICIAN ASSISTANT

## 2022-02-14 PROCEDURE — 99214 OFFICE O/P EST MOD 30 MIN: CPT | Performed by: PHYSICIAN ASSISTANT

## 2022-02-14 PROCEDURE — 4004F PT TOBACCO SCREEN RCVD TLK: CPT | Performed by: PHYSICIAN ASSISTANT

## 2022-02-14 NOTE — PROGRESS NOTES
[address] [place] [address]. Marie Marin M.D., F.A.C.P. Keturah Falk, DNP, APRN, ACNS-BC  Charla Pittman. Mukesh Valentine, MSN, APRN-FNP-C  MEGHA Mejia, PADahianaC  Karina Malave, MSN, APRN-FNP-C  [address] [facility]  [place], [address]  Phone: [phone]  Fax: [phone]       Ebony Calzada is a 43 y.o. left handed female     She presents for follow-up of multiple symptoms including headache, dizziness, balance difficulties    She presents alone and remains a good historian. Last visit propranolol was increased to 80 mg daily. Since the increase her headaches have continued to improve and she has not had any headaches in the last month. She still reports spinning sensations when turning her head to the right or laying back too quickly. Meclizine helps and her symptoms remain relatively unchanged since last visit. She is interested in going to therapy at this time. She has also noticed that she gets a little lightheaded when standing up too quickly from her desk. She previously followed up with cardiology. She wore a 48-hour heart monitor which was unrevealing. Prior MRI of the brain was completed which reported few nonspecific T2 hyperintensities. Otherwise MRI brain was unremarkable. Unfortunately I do not have the images to review and she has not brought in a disc. She does not report any difficulties with concentration and getting words out at today's visit. Overall, she feels that she is feeling better and has no additional concerns at today's visit.     No chest pain or palpitations  No SOB  No lightheadedness or loss of consciousness  No falls, tripping or stumbling  No incontinence of bowels or bladder  No itching or bruising appreciated  No numbness, focal arm/leg weakness   + headache  + \"dizziness\"  + off balanced  + tingling of hands   + concentration/ words get \"jumbled\"     ROS is otherwise negative    Allergies   Allergen Reactions    Sulfa Antibiotics Rash       Objective:       /78 (Site: Right Upper Arm)   Pulse 80   Temp 97.9 °F (36.6 °C)   Ht 5' 7\" (1.702 m)   Wt 193 lb (87.5 kg)   SpO2 100%   BMI 30.23 kg/m²     General appearance: alert, appears stated age, cooperative and in no distress  Head: normocephalic, without obvious abnormality, atraumatic  Eyes: conjunctivae/corneas clear; no drainage  Neck: Supple  Lungs: Effort normal  Extremities: normal, atraumatic, no cyanosis or edema  Skin:  color, texture, turgor normal--no rashes or lesions      Mental Status: alert and oriented. Thought content appropriate.      Appropriate attention/concentration  Intact fundus of knowledge  Repetition intact  Memories intact    Speech: no dysarthria  Language: no aphasias    Cranial Nerves:  I: smell    II: visual acuity     II: visual fields Full    II: pupils MEAGHAN   III,VII: ptosis None   III,IV,VI: extraocular muscles  EOMI without nystagmus   V: mastication Normal   V: facial light touch sensation  Normal   V,VII: corneal reflex     VII: facial muscle function - upper  Normal   VII: facial muscle function - lower Normal   VIII: hearing Normal   IX: soft palate elevation  Normal   IX,X: gag reflex    XI: trapezius strength  5/5   XI: sternocleidomastoid strength 5/5   XI: neck extension strength  5/5   XII: tongue strength  Normal     Motor:  5/5 throughout  Normal bulk and tone  No drift   No abnormal movements    Sensory:  LT  Normal  Vibration normal    Coordination:   FN, FFM  normal  HS normal    Gait:  Normal    DTR:   +2 throughout       No other pathological reflexes    Laboratory/Radiology:  ry/Radiology:     CBC with Differential:    Lab Results   Component Value Date    WBC 7.0 10/08/2021    RBC 4.38 10/08/2021    HGB 12.9 10/08/2021    HCT 38.2 10/08/2021     10/08/2021    MCV 87.2 10/08/2021    MCH 29.5 10/08/2021    MCHC 33.8 10/08/2021    RDW 14.3 10/08/2021    SEGSPCT 86 06/14/2013    LYMPHOPCT 29.2 10/08/2021    MONOPCT 6.3 10/08/2021    BASOPCT 0.4 10/08/2021    MONOSABS 0.44 10/08/2021    LYMPHSABS 2.05 10/08/2021    EOSABS 0.16 10/08/2021    BASOSABS 0.03 10/08/2021     CMP:    Lab Results   Component Value Date     10/08/2021    K 3.8 10/08/2021     10/08/2021    CO2 24 10/08/2021    BUN 10 10/08/2021    CREATININE 0.8 10/08/2021    GFRAA >60 10/08/2021    LABGLOM >60 10/08/2021    GLUCOSE 87 10/08/2021    GLUCOSE 92 02/11/2011    PROT 7.7 08/17/2021    LABALBU 4.5 08/17/2021    CALCIUM 9.3 10/08/2021    BILITOT 0.3 08/17/2021    ALKPHOS 42 08/17/2021    AST 22 08/17/2021    ALT 40 08/17/2021     CT head- unrevealing     MRI brain report reviewed (no images for me to review today)-few nonspecific T2 hyperintensities, otherwise unremarkable    All labs and images were personally reviewed at the time of this visit    Assessment:     43year old woman with c/o headaches, vertigo, confusion, heart palpitations, tingling in the hands     Headaches sound to be chronic tension type headaches-- likely worsened by stressors, inadequate sleep, skipping meals    Much improved on Inderal 80 mg daily with no headaches over the last month. She is having some side effects of lightheadedness upon standing but this is tolerable. She was advised to increase her water intake and take her time upon standing. BPPV R ear    Recommend at home exercises    Meclizine 12.5 mg TID prn    Will send for vestibular therapy at this time  Plan:     Continue Inderal to 80 mg daily    Meclizine 12.5 mg TID prn --limit use, as prolonged use can delay recovery.  If symptoms persist would recommend vestibular therapy     Referral for vestibular therapy    RTO 4 months or sooner prn     Call with questions or concerns     David Schmitt PA-C  8:09 AM  2/14/2022

## 2022-03-03 ENCOUNTER — APPOINTMENT (OUTPATIENT)
Dept: CT IMAGING | Age: 37
End: 2022-03-03
Payer: COMMERCIAL

## 2022-03-03 ENCOUNTER — HOSPITAL ENCOUNTER (EMERGENCY)
Age: 37
Discharge: HOME | End: 2022-03-03
Payer: COMMERCIAL

## 2022-03-03 VITALS
DIASTOLIC BLOOD PRESSURE: 78 MMHG | SYSTOLIC BLOOD PRESSURE: 128 MMHG | TEMPERATURE: 96.7 F | OXYGEN SATURATION: 98 % | HEART RATE: 69 BPM | WEIGHT: 195 LBS | BODY MASS INDEX: 30.61 KG/M2 | RESPIRATION RATE: 16 BRPM | HEIGHT: 67 IN

## 2022-03-03 DIAGNOSIS — R10.9 ABDOMINAL PAIN, UNSPECIFIED ABDOMINAL LOCATION: ICD-10-CM

## 2022-03-03 DIAGNOSIS — K64.4 EXTERNAL HEMORRHOID: Primary | ICD-10-CM

## 2022-03-03 LAB
ALBUMIN SERPL-MCNC: 4.5 G/DL (ref 3.5–5.2)
ALP BLD-CCNC: 54 U/L (ref 35–104)
ALT SERPL-CCNC: 23 U/L (ref 0–32)
ANION GAP SERPL CALCULATED.3IONS-SCNC: 13 MMOL/L (ref 7–16)
AST SERPL-CCNC: 34 U/L (ref 0–31)
BACTERIA: NORMAL /HPF
BASOPHILS ABSOLUTE: 0.05 E9/L (ref 0–0.2)
BASOPHILS RELATIVE PERCENT: 0.5 % (ref 0–2)
BILIRUB SERPL-MCNC: <0.2 MG/DL (ref 0–1.2)
BILIRUBIN URINE: NEGATIVE
BLOOD, URINE: NEGATIVE
BUN BLDV-MCNC: 13 MG/DL (ref 6–20)
CALCIUM SERPL-MCNC: 9.7 MG/DL (ref 8.6–10.2)
CHLORIDE BLD-SCNC: 99 MMOL/L (ref 98–107)
CLARITY: CLEAR
CO2: 25 MMOL/L (ref 22–29)
COLOR: YELLOW
CREAT SERPL-MCNC: 0.8 MG/DL (ref 0.5–1)
EOSINOPHILS ABSOLUTE: 0.26 E9/L (ref 0.05–0.5)
EOSINOPHILS RELATIVE PERCENT: 2.7 % (ref 0–6)
GFR AFRICAN AMERICAN: >60
GFR NON-AFRICAN AMERICAN: >60 ML/MIN/1.73
GLUCOSE BLD-MCNC: 85 MG/DL (ref 74–99)
GLUCOSE URINE: NEGATIVE MG/DL
HCG(URINE) PREGNANCY TEST: NEGATIVE
HCT VFR BLD CALC: 41.5 % (ref 34–48)
HEMOGLOBIN: 13.6 G/DL (ref 11.5–15.5)
IMMATURE GRANULOCYTES #: 0.02 E9/L
IMMATURE GRANULOCYTES %: 0.2 % (ref 0–5)
KETONES, URINE: NEGATIVE MG/DL
LACTIC ACID, SEPSIS: 1.2 MMOL/L (ref 0.5–1.9)
LEUKOCYTE ESTERASE, URINE: NEGATIVE
LIPASE: 31 U/L (ref 13–60)
LYMPHOCYTES ABSOLUTE: 3.02 E9/L (ref 1.5–4)
LYMPHOCYTES RELATIVE PERCENT: 31.6 % (ref 20–42)
MCH RBC QN AUTO: 27.4 PG (ref 26–35)
MCHC RBC AUTO-ENTMCNC: 32.8 % (ref 32–34.5)
MCV RBC AUTO: 83.5 FL (ref 80–99.9)
MONOCYTES ABSOLUTE: 0.63 E9/L (ref 0.1–0.95)
MONOCYTES RELATIVE PERCENT: 6.6 % (ref 2–12)
NEUTROPHILS ABSOLUTE: 5.58 E9/L (ref 1.8–7.3)
NEUTROPHILS RELATIVE PERCENT: 58.4 % (ref 43–80)
NITRITE, URINE: NEGATIVE
PDW BLD-RTO: 15.6 FL (ref 11.5–15)
PH UA: 6.5 (ref 5–9)
PLATELET # BLD: 319 E9/L (ref 130–450)
PMV BLD AUTO: 11.2 FL (ref 7–12)
POTASSIUM SERPL-SCNC: 3.8 MMOL/L (ref 3.5–5)
PROTEIN UA: NEGATIVE MG/DL
RBC # BLD: 4.97 E12/L (ref 3.5–5.5)
RBC UA: NORMAL /HPF (ref 0–2)
SODIUM BLD-SCNC: 137 MMOL/L (ref 132–146)
SPECIFIC GRAVITY UA: <=1.005 (ref 1–1.03)
TOTAL PROTEIN: 8 G/DL (ref 6.4–8.3)
UROBILINOGEN, URINE: 0.2 E.U./DL
WBC # BLD: 9.6 E9/L (ref 4.5–11.5)
WBC UA: NORMAL /HPF (ref 0–5)

## 2022-03-03 PROCEDURE — 36415 COLL VENOUS BLD VENIPUNCTURE: CPT

## 2022-03-03 PROCEDURE — 6360000004 HC RX CONTRAST MEDICATION: Performed by: RADIOLOGY

## 2022-03-03 PROCEDURE — 83605 ASSAY OF LACTIC ACID: CPT

## 2022-03-03 PROCEDURE — 96375 TX/PRO/DX INJ NEW DRUG ADDON: CPT

## 2022-03-03 PROCEDURE — 83690 ASSAY OF LIPASE: CPT

## 2022-03-03 PROCEDURE — 85025 COMPLETE CBC W/AUTO DIFF WBC: CPT

## 2022-03-03 PROCEDURE — 81001 URINALYSIS AUTO W/SCOPE: CPT

## 2022-03-03 PROCEDURE — 2580000003 HC RX 258: Performed by: NURSE PRACTITIONER

## 2022-03-03 PROCEDURE — 6360000002 HC RX W HCPCS: Performed by: NURSE PRACTITIONER

## 2022-03-03 PROCEDURE — 81025 URINE PREGNANCY TEST: CPT

## 2022-03-03 PROCEDURE — 80053 COMPREHEN METABOLIC PANEL: CPT

## 2022-03-03 PROCEDURE — 2500000003 HC RX 250 WO HCPCS: Performed by: NURSE PRACTITIONER

## 2022-03-03 PROCEDURE — 96374 THER/PROPH/DIAG INJ IV PUSH: CPT

## 2022-03-03 PROCEDURE — 99284 EMERGENCY DEPT VISIT MOD MDM: CPT

## 2022-03-03 PROCEDURE — 74177 CT ABD & PELVIS W/CONTRAST: CPT

## 2022-03-03 RX ORDER — HYDROCORTISONE ACETATE 25 MG/1
25 SUPPOSITORY RECTAL 2 TIMES DAILY PRN
Qty: 14 SUPPOSITORY | Refills: 0 | Status: SHIPPED | OUTPATIENT
Start: 2022-03-03 | End: 2022-03-10

## 2022-03-03 RX ORDER — ONDANSETRON 4 MG/1
4 TABLET, ORALLY DISINTEGRATING ORAL EVERY 8 HOURS PRN
Qty: 15 TABLET | Refills: 0 | Status: SHIPPED | OUTPATIENT
Start: 2022-03-03 | End: 2022-03-08

## 2022-03-03 RX ORDER — PROCHLORPERAZINE EDISYLATE 5 MG/ML
10 INJECTION INTRAMUSCULAR; INTRAVENOUS ONCE
Status: COMPLETED | OUTPATIENT
Start: 2022-03-03 | End: 2022-03-03

## 2022-03-03 RX ORDER — FAMOTIDINE 20 MG/1
20 TABLET, FILM COATED ORAL 2 TIMES DAILY
Qty: 14 TABLET | Refills: 0 | Status: SHIPPED | OUTPATIENT
Start: 2022-03-03 | End: 2022-03-10

## 2022-03-03 RX ORDER — SUCRALFATE 1 G/1
1 TABLET ORAL
Qty: 56 TABLET | Refills: 0 | Status: SHIPPED | OUTPATIENT
Start: 2022-03-03 | End: 2022-03-17

## 2022-03-03 RX ORDER — 0.9 % SODIUM CHLORIDE 0.9 %
1000 INTRAVENOUS SOLUTION INTRAVENOUS ONCE
Status: COMPLETED | OUTPATIENT
Start: 2022-03-03 | End: 2022-03-03

## 2022-03-03 RX ADMIN — PROCHLORPERAZINE EDISYLATE 10 MG: 5 INJECTION INTRAMUSCULAR; INTRAVENOUS at 21:38

## 2022-03-03 RX ADMIN — SODIUM CHLORIDE 1000 ML: 9 INJECTION, SOLUTION INTRAVENOUS at 21:38

## 2022-03-03 RX ADMIN — FAMOTIDINE 20 MG: 10 INJECTION INTRAVENOUS at 21:38

## 2022-03-03 RX ADMIN — IOPAMIDOL 75 ML: 755 INJECTION, SOLUTION INTRAVENOUS at 22:16

## 2022-03-03 NOTE — Clinical Note
Yulissa Herrera was seen and treated in our emergency department on 3/3/2022. She may return to work on 03/06/2022. If you have any questions or concerns, please don't hesitate to call.       Viktor Sena, MIGUELINA - CNP

## 2022-03-04 NOTE — ED PROVIDER NOTES
[address]  Department of Emergency Medicine   ED  Encounter Note  Admit Date/RoomTime: 3/3/2022  8:28 PM  ED Room:   NAME: Ebony Calzada  : 1985  MRN: [id number]     Chief Complaint:  Other (intermittent blood over past week noted in stool)    HISTORY OF PRESENT ILLNESS        Ebony Calzada is a 43 y.o. female who presents to the ED for evaluation of intermittent rectal bleeding over the last 2 weeks. Patient has a history of peptic ulcer disease years ago which she follows with Dr. Leonela Moss. She denies taking any PPI, H2 blockers or medications for this. She does not take NSAIDs, denies alcohol use or any recent foods triggering her symptoms. She reports having a fever of 100.3 yesterday with chills, nausea without vomiting, abdominal pain and bright red blood per rectum a couple of days ago. There is been no syncope, dizziness, or eye symptoms, exposure to Covid, chest pain, shortness of breath, hematemesis, constipation, dysuria, hematuria or vaginal bleeding. She has no concern for pregnancy as her tubes are tied. She is not taking any over-the-counter intervention for symptoms. Nothing seems to aggravate them. She contacted her primary care provider to get her blood drawn to ensure she is not anemic as she has required blood transfusions in the past and he referred her to the emergency department. She denies any history of surgeries to the abdomen in the past.  Her symptoms are moderate in severity and intermittent nature. ROS   Pertinent positives and negatives are stated within HPI, all other systems reviewed and are negative. Past Medical History:  has a past medical history of Anemia, GI (gastrointestinal bleed), History of blood transfusion, and Ovarian cyst.    Surgical History:  has a past surgical history that includes Endoscopy, colon, diagnostic (2018); cyst removal (Left);  Colonoscopy; pr hysteroscopy,w/endometrial ablation (N/A, 7/18/2018); and Upper gastrointestinal endoscopy (N/A, 10/14/2020). Social History:  reports that she has been smoking cigarettes. She has a 10.00 pack-year smoking history. She has never used smokeless tobacco. She reports that she does not drink alcohol and does not use drugs. Family History: family history is not on file. Allergies: Sulfa antibiotics    PHYSICAL EXAM   Oxygen Saturation Interpretation: Normal on room air analysis. ED Triage Vitals   BP Temp Temp Source Pulse Resp SpO2 Height Weight   03/03/22 2021 03/03/22 2021 03/03/22 2021 03/03/22 2021 03/03/22 2021 03/03/22 2021 03/03/22 2025 03/03/22 2025   (!) 141/76 97.9 °F (36.6 °C) Temporal 85 16 100 % 5' 7\" (1.702 m) 195 lb (88.5 kg)       Physical Exam  Constitutional/General: Alert and oriented x3, well appearing, non toxic  HEENT:  NC/NT. PERRLA. Conjunctiva red airway patent. Neck: Supple, full ROM. No midline vertebral tenderness or crepitus. Respiratory: Lung sounds clear to auscultation bilaterally. No wheezes, rhonchi or stridor. Not in respiratory distress. CV:  Regular rate. No resting tachycardia. Regular rhythm. No murmurs or rubs. 2+ distal pulses. GI:  Abdomen soft, tenderness in the epigastrium and mid left abdomen, non-distended. +BS. No rebound, guarding, or rigidity. No pulsatile masses. Rectal Exam: (Same sex / third party chaperone, Manuela RN present during examination). There is a large external hemorrhoid that is nonthrombosed, tenderness with rectal exam, no impacted stool in the vault, no masses, tan brown stool tests negative for hemoccult. Musculoskeletal: Moves all extremities x 4. Warm and well perfused. Capillary refill <3 seconds  Integument: Skin warm and dry. No rashes. Neurologic: Alert and oriented with no focal deficits, symmetric strength 5/5 in the upper and lower extremities bilaterally. Psychiatric: Normal affect.     Lab / Imaging Results   (All laboratory and radiology results have been personally reviewed by myself)  Labs:  Results for orders placed or performed during the hospital encounter of 03/03/22   Comprehensive Metabolic Panel   Result Value Ref Range    Sodium 137 132 - 146 mmol/L    Potassium 3.8 3.5 - 5.0 mmol/L    Chloride 99 98 - 107 mmol/L    CO2 25 22 - 29 mmol/L    Anion Gap 13 7 - 16 mmol/L    Glucose 85 74 - 99 mg/dL    BUN 13 6 - 20 mg/dL    CREATININE 0.8 0.5 - 1.0 mg/dL    GFR Non-African American >60 >=60 mL/min/1.73    GFR African American >60     Calcium 9.7 8.6 - 10.2 mg/dL    Total Protein 8.0 6.4 - 8.3 g/dL    Albumin 4.5 3.5 - 5.2 g/dL    Total Bilirubin <0.2 0.0 - 1.2 mg/dL    Alkaline Phosphatase 54 35 - 104 U/L    ALT 23 0 - 32 U/L    AST 34 (H) 0 - 31 U/L   Lactate, Sepsis   Result Value Ref Range    Lactic Acid, Sepsis 1.2 0.5 - 1.9 mmol/L   Lipase   Result Value Ref Range    Lipase 31 13 - 60 U/L   CBC with Auto Differential   Result Value Ref Range    WBC 9.6 4.5 - 11.5 E9/L    RBC 4.97 3.50 - 5.50 E12/L    Hemoglobin 13.6 11.5 - 15.5 g/dL    Hematocrit 41.5 34.0 - 48.0 %    MCV 83.5 80.0 - 99.9 fL    MCH 27.4 26.0 - 35.0 pg    MCHC 32.8 32.0 - 34.5 %    RDW 15.6 (H) 11.5 - 15.0 fL    Platelets 526 088 - 176 E9/L    MPV 11.2 7.0 - 12.0 fL    Neutrophils % 58.4 43.0 - 80.0 %    Immature Granulocytes % 0.2 0.0 - 5.0 %    Lymphocytes % 31.6 20.0 - 42.0 %    Monocytes % 6.6 2.0 - 12.0 %    Eosinophils % 2.7 0.0 - 6.0 %    Basophils % 0.5 0.0 - 2.0 %    Neutrophils Absolute 5.58 1.80 - 7.30 E9/L    Immature Granulocytes # 0.02 E9/L    Lymphocytes Absolute 3.02 1.50 - 4.00 E9/L    Monocytes Absolute 0.63 0.10 - 0.95 E9/L    Eosinophils Absolute 0.26 0.05 - 0.50 E9/L    Basophils Absolute 0.05 0.00 - 0.20 E9/L   Urinalysis with Microscopic   Result Value Ref Range    Color, UA Yellow Straw/Yellow    Clarity, UA Clear Clear    Glucose, Ur Negative Negative mg/dL    Bilirubin Urine Negative Negative    Ketones, Urine Negative Negative mg/dL    Specific Gravity, UA <=1.005 1.005 - 1.030    Blood, Urine Negative Negative    pH, UA 6.5 5.0 - 9.0    Protein, UA Negative Negative mg/dL    Urobilinogen, Urine 0.2 <2.0 E.U./dL    Nitrite, Urine Negative Negative    Leukocyte Esterase, Urine Negative Negative    WBC, UA NONE 0 - 5 /HPF    RBC, UA NONE 0 - 2 /HPF    Bacteria, UA NONE SEEN None Seen /HPF   Pregnancy, Urine   Result Value Ref Range    HCG(Urine) Pregnancy Test NEGATIVE NEGATIVE     Imaging: All Radiology results interpreted by Radiologist unless otherwise noted. CT ABDOMEN PELVIS W IV CONTRAST Additional Contrast? None   Final Result   No acute abnormality identified. ED Course / Medical Decision Making     Medications   0.9 % sodium chloride bolus (1,000 mLs IntraVENous New Bag 3/3/22 2138)   prochlorperazine (COMPAZINE) injection 10 mg (10 mg IntraVENous Given 3/3/22 2138)   famotidine (PEPCID) injection 20 mg (20 mg IntraVENous Given 3/3/22 2138)   iopamidol (ISOVUE-370) 76 % injection 75 mL (75 mLs IntraVENous Given 3/3/22 2216)        Re-examination:  3/3/22       Time: 2230  Patients condition has improved and is currently asymptomatic. Discussed lab results. Patient is awaiting CT result. She states the medications helped her significantly. Consult(s):   None    Procedure(s):   none    MDM:   Patient evaluated by myself. Differential diagnosis of gastritis, peptic ulcer disease, diverticulitis in addition to her obvious external hemorrhoid which is nonthrombosed. Her Hemoccult was negative however her likely source of bleeding is from the external hemorrhoid. Labs as resulted above with stable H&H, she is not tachycardic, blood pressure stable and she is afebrile. Given her previous history of peptic ulcer disease with the epigastric tenderness as well as left-sided abdominal discomfort, shared decision making to complete his CT with IV contrast today. CT as interpreted by radiologist negative for acute pathology.   Patient became asymptomatic after IV Pepcid and Compazine. Plan is for outpatient management with her primary care provider and gastroenterologist.  In the meantime she will be started on Carafate, Pepcid, Zofran as needed and Anusol. She will call tomorrow to make outpatient follow-up appointments. She is aware signs and symptoms indicative of reevaluation in the emergency department setting. Patient verbalizes understanding of instructions and departed in stable condition. Plan of Care/Counseling:  MIGUELINA Reyes CNP reviewed today's visit with the patient in addition to providing specific details for the plan of care and counseling regarding the diagnosis and prognosis. Questions are answered at this time and are agreeable with the plan. ASSESSMENT     1. External hemorrhoid    2. Abdominal pain, unspecified abdominal location      PLAN   Discharged home. Patient condition is stable    New Medications     New Prescriptions    FAMOTIDINE (PEPCID) 20 MG TABLET    Take 1 tablet by mouth 2 times daily for 7 days    HYDROCORTISONE (ANUSOL-HC) 25 MG SUPPOSITORY    Place 1 suppository rectally 2 times daily as needed for Hemorrhoids    ONDANSETRON (ZOFRAN-ODT) 4 MG DISINTEGRATING TABLET    Take 1 tablet by mouth every 8 hours as needed for Nausea or Vomiting    SUCRALFATE (CARAFATE) 1 GM TABLET    Take 1 tablet by mouth 4 times daily (before meals and nightly) for 14 days     Electronically signed by MIGUELINA Reyes CNP   DD: 3/3/22  **This report was transcribed using voice recognition software. Every effort was made to ensure accuracy; however, inadvertent computerized transcription errors may be present.   END OF ED PROVIDER NOTE        MIGUELINA Reyes CNP  03/03/22 3981

## 2022-03-23 ENCOUNTER — EVALUATION (OUTPATIENT)
Dept: PHYSICAL THERAPY | Age: 37
End: 2022-03-23
Payer: COMMERCIAL

## 2022-03-23 DIAGNOSIS — H81.11 BENIGN PAROXYSMAL POSITIONAL VERTIGO OF RIGHT EAR: Primary | ICD-10-CM

## 2022-03-23 PROCEDURE — 97162 PT EVAL MOD COMPLEX 30 MIN: CPT | Performed by: PHYSICAL THERAPIST

## 2022-03-23 NOTE — PROGRESS NOTES
[facility]          Phone: [phone] Fax: [phone]    Physical Therapy Daily Treatment Note  Date:  3/23/2022    Patient Name:  Emily Medina    :  1985  MRN: [id number]    Evaluating Therapist:  Kellie Hayes, [place] [id number]    Restrictions/Precautions:    Diagnosis:     Diagnosis Orders   1.  Benign paroxysmal positional vertigo of right ear       Treatment Diagnosis:    Insurance/Certification information:  [facility]  Referring Physician:  HEMANT Mims  Plan of care signed (Y/N):    Visit# / total visits:  -10  Pain level: 3/10   Time In:  1705  Time Out:  174    Subjective:  See evaluation    Exercises:  Exercise/Equipment Resistance/Repetitions Other comments     targets  Sitting, on ball, standing on floor vs foam vs trampoline     Head movements with eyes closed       VOR       Head circles       Ball circles       Gait with head movements       Body turns at wall                                                                                              Other Therapeutic Activities:  PT evaluation completed    Home Exercise Program:  3/23/22 - seated targets, seated head circles    Manual Treatments:  MFR PRN    Modalities:  N/A     Time-in Time-out Total Time   50295  Evaluation Low Complexity      35412  Evaluation Med Complexity 1705 1745 40   80024  Evaluation High Complexity      39071  Ther Ex      00010  Neuro Re-ed        52242  Ther Activities        40575  Manual Therapy       57564  E-stim       67091  Ultrasound            Session 5440 2129 40       Treatment/Activity Tolerance:  [x] Patient tolerated treatment well [] Patient limited by fatigue  [] Patient limited by pain  [] Patient limited by other medical complications  [] Other:     Prognosis: [x] Good [] Fair  [] Poor    Patient Requires Follow-up: [x] Yes  [] No    Plan:   [] Continue per plan of care [] Alter current plan (see comments)  [x] Plan of care initiated [] Hold pending MD visit [] Discharge  Plan for Next Session:        Electronically signed by:  Sosa Flores, [address] [facility], [id number]

## 2022-03-23 NOTE — PROGRESS NOTES
[facility]   Phone: [phone]   Fax: [phone]           Date:  3/23/2022   Patient: Lara Moya  : 1985  MRN: [id number]  Referring Provider: HEMANT Jacobo  [facility],  [address] [facility]     Medical Diagnosis:      Diagnosis Orders   1. Benign paroxysmal positional vertigo of right ear          SUBJECTIVE:     Onset date: 22    Onset[de-identified] Sudden onset    Mechanism of Injury / History: Pt first noted unsteadiness while running the steps during her workout last fall. Also noted some headaches and lightheadedness upon standing. Previous PT: yes - did not help    Medical Management for Current Problem: medications    Chief complaint: pain, dizziness    Behavior: condition is staying the same    Description Of Symptoms:  Pt reports frontal headaches and dizziness/lightheaded sensation. Also reports occasional ringing in the ears and blurred vision. Pain: headache  Current: 3/10             Imaging results: CT ABDOMEN PELVIS W IV CONTRAST Additional Contrast? None    Result Date: 3/3/2022  EXAMINATION: CT OF THE ABDOMEN AND PELVIS WITH CONTRAST 3/3/2022 10:14 pm TECHNIQUE: CT of the abdomen and pelvis was performed with the administration of intravenous contrast. Multiplanar reformatted images are provided for review. Dose modulation, iterative reconstruction, and/or weight based adjustment of the mA/kV was utilized to reduce the radiation dose to as low as reasonably achievable. COMPARISON: 2019 HISTORY: ORDERING SYSTEM PROVIDED HISTORY: epigastric and left sided abdominal pain TECHNOLOGIST PROVIDED HISTORY: Additional Contrast?->None Reason for exam:->epigastric and left sided abdominal pain Decision Support Exception - unselect if not a suspected or confirmed emergency medical condition->Emergency Medical Condition (MA) FINDINGS: Lower Chest: The visualized portions of the lung bases are clear. Organs:  The visualized liver, spleen, pancreas, adrenal glands and kidneys demonstrate no significant abnormality. GI/Bowel: There are no findings of intestinal obstruction. The appendix is normal.  Gallbladder appears grossly unremarkable. Pelvis:  Bladder is unremarkable in appearance. There is no abnormal pelvic mass or fluid collection seen. Peritoneum/Retroperitoneum: There is no abdominal aortic aneurysm. There is no abnormal lymphadenopathy seen. There is no abnormal fluid collection. There is no free intraperitoneal air. Bones/Soft Tissues: No acute abnormality. Thoracolumbar scoliosis. No acute abnormality identified. Past Medical History:  Past Medical History:   Diagnosis Date    Anemia     GI (gastrointestinal bleed)     History of blood transfusion     Ovarian cyst      Past Surgical History:   Procedure Laterality Date    COLONOSCOPY      CYST REMOVAL Left     wrist    ENDOSCOPY, COLON, DIAGNOSTIC  02/01/2018    MO HYSTEROSCOPY,W/ENDOMETRIAL ABLATION N/A 7/18/2018    HYSTEROSCOPY, DILATATION AND CURETTAGE performed by Gi Polanco MD at [address] [place] [address] 10/14/2020    EGD BIOPSY performed by Stephanie Morrow MD at [facility] ENDOSCOPY       Medications:   Current Outpatient Medications   Medication Sig Dispense Refill    famotidine (PEPCID) 20 MG tablet Take 1 tablet by mouth 2 times daily for 7 days 14 tablet 0    sucralfate (CARAFATE) 1 GM tablet Take 1 tablet by mouth 4 times daily (before meals and nightly) for 14 days 56 tablet 0    albuterol (ACCUNEB) 0.63 MG/3ML nebulizer solution Take 1 ampule by nebulization every 6 hours as needed for Wheezing        No current facility-administered medications for this visit. Occupation: . Physical demands include: walking, standing, computer and desk work. Status: full time.     Exercise regimen: cardio, running    Hobbies: none    Patient Goals: paim relief, get rid of dizziness, return to exercise    Precautions/Contraindications: none    Dizziness Handicap Inventory:  52/100      OBJECTIVE:     Vertebral Artery Testing in sitting: N/A    Posture/Alignment: WFL    Oculomotor and Vestibulo-Ocular Examination:   Spontaneous Nystagmus:  None noted  Gaze Induced Nystagmus: none noted  Smooth Pursuits: WFL  Saccades: WFL   VOR (Slow): WFL  Head Thrust: WFL      Positional Testing:   Roll Test: N/A  Elizabeth Hallpike: Right: (-)                       Left: (-)    [place] SOP (N=Normal, S=Sway, F=Fall):   Condition #1 (Romberg eyes open): N  Condition #2 (Romberg eyes closed): N  Condition #3 (Tandem Romberg eyes open):  N  Condition #4 (Tandem Romberg eyes closed): S  Condition #5 (CTSIB eyes open):  N  Condition #6 (CTSIB eyes closed):  S  Condition #7 (Stepping Fukuda): N    Cervical Testing:  [facility]    Functional Activities:   Transfers (sit to stand ):   Independent     Gait Testing:   Dynamic Gait Index Score: 23/24 (path altered with vertical head movements)    Gait Deviations (firm surface/linoleum): none  Assistive Device Used: none  Steps: 5 steps, independent without rails, reciprocal pattern    Strength Testing: [facility]       ASSESSMENT      Complaint of Dizziness (Dizziness Handicap Inventory)   Decreased Dynamic Gait Index   Functional Impairment   Musculoskeletal Pain   Other      Long Term goals (4-5 weeks)   Decrease reported pain/headache to 0-2/10   Able to perform/complete the following functions/tasks: Improve Dynamic Gait Index to 24/24   Decrease Handicap Inventory 20/100   Independent with Home Exercise Programs    Rehab Potential: [x] Good  [] Fair  [] Poor    PLAN      Canalith Repositioning Manuevers for BPPV as needed    Adaptation/Habituation Exercises   Gaze Stabilization/VOR Exercises   Neuromuscular Re-education   Strengthening/ROM Exercises   Gait Training   ROM/Stretching Exercises   HEP Instruction   Manual Therapy    The following CPT codes are likely to be used in the care of this patient: 52722 PT Evaluation: Moderate Complexity, 95244 PT Re-Evaluation, 33747 Therapeutic Exercise, [phone] Neuromuscular Re-Education, 58508 Therapeutic Activities, 94890 Manual Therapy and 20383 Canalith Repositioning    Patient Education:  [x] Plans/Goals, Risks/Benefits discussed  [x] Home exercise program  Method of Education: [x] Verbal  [x] Demo  [x] Written  Comprehension of Education:  [x] Verbalizes understanding. [x] Demonstrates understanding. [] Needs Review. [] Demonstrates/verbalizes understanding of HEP/Ed previously given. Frequency:  2 days per week for 4-5 weeks    Patient understands diagnosis/prognosis and consents to treatment, plan and goals: [x] Yes    [] No     Thank you for the opportunity to work with your patient. If you have questions or comments, please contact me at numbers listed above. Electronically Signed by: Jay Jackson PT  License Number: [id number]    Date: 3/23/2022    Medicare Patients Only     Please sign Physician's Certification and return to: [address]  [facility] PHYS[facility]ER[facility]  [address] [address]  Dept: [phone]  Dept Fax: [phone]  Loc: [phone] Certification / Comments     Frequency/Duration 2 days per week for 4-5 weeks. Certification period from 3/23/2022  to 6/22/22. I have reviewed the Plan of Care established for skilled therapy services and certify that the services are required and that they will be provided while the patient is under my care.     Physician's Comments/Revisions:               Physician's Printed Name:                                           [de-identified] Signature:                                                               Date:

## 2022-03-31 ENCOUNTER — TREATMENT (OUTPATIENT)
Dept: PHYSICAL THERAPY | Age: 37
End: 2022-03-31
Payer: COMMERCIAL

## 2022-03-31 DIAGNOSIS — H81.11 BENIGN PAROXYSMAL POSITIONAL VERTIGO OF RIGHT EAR: Primary | ICD-10-CM

## 2022-03-31 PROCEDURE — 95992 CANALITH REPOSITIONING PROC: CPT | Performed by: PHYSICAL THERAPIST

## 2022-03-31 NOTE — PROGRESS NOTES
[facility]          Phone: [phone] Fax: [phone]    Physical Therapy Daily Treatment Note  Date:  3/31/2022    Patient Name:  Lara Moya    :  1985  MRN: [id number]    Evaluating Therapist:  Sujata Hunter, PT [id number]    Restrictions/Precautions:    Diagnosis:     Diagnosis Orders   1. Benign paroxysmal positional vertigo of right ear       Treatment Diagnosis:    Insurance/Certification information:  [facility]  Referring Physician:  HEMANT Jacobo  Plan of care signed (Y/N):    Visit# / total visits:  -10  Pain level: 3/10   Time In:  07  Time Out:  07    Subjective:  Pt reports compliance with HEP. Denies any problems with up and down movement, but continues to have dizziness with side to side movements. Exercises:  Exercise/Equipment Resistance/Repetitions Other comments     targets  Sitting, on ball, standing on floor vs foam vs trampoline     Head movements with eyes closed       VOR       Head circles       Ball circles       Gait with head movements       Body turns at wall                                                                                              Other: Reassessed Elizabeth-Hallpike for right side. Pt reported mild dizziness which abated after approx 1 minute, however, no nystagmus noted. Opted to proceed into Epley maneuver for right side. After 5 minutes reassessed right side Euclid-Hallpike, with no c/o dizziness or nystagmus noted on reassessment. Instructed pt to limit activities/repetetive head movements today, resume normal activities tomorrow, and hold HEP for next 3 days. Pt verbalized understanding.      Home Exercise Program:  3/23/22 - seated targets, seated head circles    Manual Treatments:  MFR PRN    Modalities:  N/A     Time-in Time-out Total Time   75360  Evaluation Low Complexity      57233  Evaluation Med Complexity      24698  Evaluation High Complexity      94195  Ther Ex      [id number]  Neuro Re-ed        48504 Ther Activities        29835  Manual Therapy       09230  E-stim       78060  Ultrasound      46471  Canalith Repositioning  0719 0732 13         Session 0719 0732 13       Treatment/Activity Tolerance:  [x] Patient tolerated treatment well [] Patient limited by fatigue  [] Patient limited by pain  [] Patient limited by other medical complications  [] Other:     Prognosis: [x] Good [] Fair  [] Poor    Patient Requires Follow-up: [x] Yes  [] No    Plan:   [x] Continue per plan of care [] Alter current plan (see comments)  [] Plan of care initiated [] Hold pending MD visit [] Discharge  Plan for Next Session:  Reassess right ear BPPV and continue with canalith repositioning or progress to vestibular rehab as appropriate.       Electronically signed by:  Sepideh Clark, [id number]

## 2022-04-05 ENCOUNTER — TREATMENT (OUTPATIENT)
Dept: PHYSICAL THERAPY | Age: 37
End: 2022-04-05
Payer: COMMERCIAL

## 2022-04-05 DIAGNOSIS — H81.11 BENIGN PAROXYSMAL POSITIONAL VERTIGO OF RIGHT EAR: Primary | ICD-10-CM

## 2022-04-05 PROCEDURE — 97112 NEUROMUSCULAR REEDUCATION: CPT | Performed by: PHYSICAL THERAPIST

## 2022-04-05 NOTE — PROGRESS NOTES
[facility]          Phone: [phone] Fax: [phone]    Physical Therapy Daily Treatment Note  Date:  2022    Patient Name:  Gladis Madrigal    :  1985  MRN: [id number]    Evaluating Therapist:  Yoni Samayoa, PT [id number]    Restrictions/Precautions:    Diagnosis:     Diagnosis Orders   1. Benign paroxysmal positional vertigo of right ear       Treatment Diagnosis:    Insurance/Certification information:  [facility]  Referring Physician:  HEMANT Dia  Plan of care signed (Y/N):    Visit# / total visits:  3/8-10  Pain level: 3/10   Time In:  719  Time Out:  746    Subjective:  Pt reports feeling good, denies any episodes of dizziness since last visit. Reports that she has not tried to resume her HEP. Exercises:  Exercise/Equipment Resistance/Repetitions Other comments     targets Seated in chair, left/right and up/down, metronome at 75, 2 x 10 reps each Mod dizziness     Head movements with eyes closed Seated in chair, left/right and up/down, metronome at 70-75, 2 x 10 reps each Mild to mod dizziness     VOR Seated in chair, left/right and up/down 2 x 10 reps each Mild dizziness, mild blurred vision     Head circles Seated in chair x 10 reps each CW and CCW, eyes open and eyes closed Mod dizziness     Ball circles x10 reps each CW and CCW Mild dizziness     Gait with head movements       Body turns at wall                                                                                              Other: Reassessed Elizabeth-Hallpike for right side, (-) for both nystagmus and subjective c/o dizziness. Had pt complete Dizziness Handicap Inventory due to pt denying symptoms for the past week. DHI score was 46/100, therefore proceeded to vestibular rehab activities as above due to continued symptoms.     Home Exercise Program:  3/23/22 - seated targets, seated head circles; 22 - VOR seated    Manual Treatments:  MFR PRN    Modalities:  N/A     Time-in Time-out Total Time   81439  Evaluation Low Complexity      55516  Evaluation Med Complexity      18779  Evaluation High Complexity      76645  Ther Ex      21471  Neuro Re-ed   6625 9996 80   52028  Ther Activities        77901  Manual Therapy       51546  E-stim       65597  Ultrasound      11743  Canalith Repositioning             Session 0998 3988 40       Treatment/Activity Tolerance:  [x] Patient tolerated treatment well [] Patient limited by fatigue  [] Patient limited by pain  [] Patient limited by other medical complications  [] Other:     Prognosis: [x] Good [] Fair  [] Poor    Patient Requires Follow-up: [x] Yes  [] No    Plan:   [x] Continue per plan of care [] Alter current plan (see comments)  [] Plan of care initiated [] Hold pending MD visit [] Discharge  Plan for Next Session:        Electronically signed by:  Sepideh Alvarado, [id number]

## 2022-04-13 ENCOUNTER — TREATMENT (OUTPATIENT)
Dept: PHYSICAL THERAPY | Age: 37
End: 2022-04-13

## 2022-05-05 ENCOUNTER — TELEPHONE (OUTPATIENT)
Dept: CARDIOLOGY | Age: 37
End: 2022-05-05

## 2022-05-05 NOTE — TELEPHONE ENCOUNTER
CALLED PATIENT AND LEFT MESSAGE TO SCHEDULE ECHO    Electronically signed by Aiden Nelson on 5/5/2022 at 12:06 PM

## 2022-05-16 ENCOUNTER — OFFICE VISIT (OUTPATIENT)
Dept: NEUROLOGY | Age: 37
End: 2022-05-16
Payer: COMMERCIAL

## 2022-05-16 VITALS
HEART RATE: 73 BPM | DIASTOLIC BLOOD PRESSURE: 80 MMHG | OXYGEN SATURATION: 95 % | SYSTOLIC BLOOD PRESSURE: 133 MMHG | HEIGHT: 67 IN | WEIGHT: 201 LBS | TEMPERATURE: 98.1 F | BODY MASS INDEX: 31.55 KG/M2

## 2022-05-16 DIAGNOSIS — H81.11 BENIGN PAROXYSMAL POSITIONAL VERTIGO OF RIGHT EAR: ICD-10-CM

## 2022-05-16 DIAGNOSIS — G44.229 CHRONIC TENSION-TYPE HEADACHE, NOT INTRACTABLE: Primary | ICD-10-CM

## 2022-05-16 PROCEDURE — G8417 CALC BMI ABV UP PARAM F/U: HCPCS | Performed by: PHYSICIAN ASSISTANT

## 2022-05-16 PROCEDURE — 99213 OFFICE O/P EST LOW 20 MIN: CPT | Performed by: PHYSICIAN ASSISTANT

## 2022-05-16 PROCEDURE — G8427 DOCREV CUR MEDS BY ELIG CLIN: HCPCS | Performed by: PHYSICIAN ASSISTANT

## 2022-05-16 PROCEDURE — 4004F PT TOBACCO SCREEN RCVD TLK: CPT | Performed by: PHYSICIAN ASSISTANT

## 2022-05-16 RX ORDER — LEVOTHYROXINE SODIUM 0.03 MG/1
TABLET ORAL
COMMUNITY
Start: 2022-05-14

## 2022-05-16 NOTE — PROGRESS NOTES
[address] [facility]. Bell Scruggs M.D., F.A.C.P. Ebenezer Adame, DNP, APRN, ACNS-[facility]. Hayes Noel, MSN, APRN-FNP-C  MEGHA Orosco, PADahianaC  Davi Shetty, MSN, APRN-FNP-C  [address], [facility]  [place], [address] [address]  Phone: [phone]  Fax: [phone]       Candelario Spicer is a 43 y.o. left handed female     She presents for follow-up of multiple symptoms including headache, dizziness, balance difficulties    She presents alone and remains a good historian. Since last visit, she has discontinued the Inderal. Her headaches have not returned. She reports that it has bee \"a hot minute\" since her last headache. Denies need for any medication at this time. Vestibular therapy did help with her dizziness. No longer going. Reports some mild symptoms when on the treadmill. Overall, feels she is doing much better. No complaints at today's visit. No chest pain or palpitations  No SOB  No lightheadedness or loss of consciousness  No falls, tripping or stumbling  No incontinence of bowels or bladder  No itching or bruising appreciated  No numbness, focal arm/leg weakness     ROS is otherwise negative    Allergies   Allergen Reactions    Sulfa Antibiotics Rash       Objective:       /80 (Site: Right Upper Arm)   Pulse 73   Temp 98.1 °F (36.7 °C)   Ht 5' 7\" (1.702 m)   Wt 201 lb (91.2 kg)   SpO2 95%   BMI 31.48 kg/m²     General appearance: alert, appears stated age, cooperative and in no distress  Head: normocephalic, without obvious abnormality, atraumatic  Eyes: conjunctivae/corneas clear; no drainage  Neck: Supple  Lungs: Effort normal  Extremities: normal, atraumatic, no cyanosis or edema  Skin:  color, texture, turgor normal--no rashes or lesions      Mental Status: alert and oriented. Thought content appropriate.      Appropriate attention/concentration    Speech: no dysarthria  Language: no aphasias    Cranial Nerves:  I: smell    II: visual acuity     II: visual fields Full    II: pupils MEAGHAN   III,VII: ptosis None   III,IV,VI: extraocular muscles  EOMI without nystagmus   V: mastication Normal   V: facial light touch sensation  Normal   V,VII: corneal reflex     VII: facial muscle function - upper  Normal   VII: facial muscle function - lower Normal   VIII: hearing Normal   IX: soft palate elevation  Normal   IX,X: gag reflex    XI: trapezius strength  5/5   XI: sternocleidomastoid strength 5/5   XI: neck extension strength  5/5   XII: tongue strength  Normal     Motor:  5/5 throughout  Normal bulk and tone  No drift   No abnormal movements    Sensory:  LT  Normal    Coordination:   FN, FFM  normal  HS normal    Gait:  Normal    No other pathological reflexes    Laboratory/Radiology:  ry/Radiology:     CBC with Differential:    Lab Results   Component Value Date    WBC 9.6 03/03/2022    RBC 4.97 03/03/2022    HGB 13.6 03/03/2022    HCT 41.5 03/03/2022     03/03/2022    MCV 83.5 03/03/2022    MCH 27.4 03/03/2022    MCHC 32.8 03/03/2022    RDW 15.6 03/03/2022    SEGSPCT 86 06/14/2013    LYMPHOPCT 31.6 03/03/2022    MONOPCT 6.6 03/03/2022    BASOPCT 0.5 03/03/2022    MONOSABS 0.63 03/03/2022    LYMPHSABS 3.02 03/03/2022    EOSABS 0.26 03/03/2022    BASOSABS 0.05 03/03/2022     CMP:    Lab Results   Component Value Date     03/03/2022    K 3.8 03/03/2022    K 3.8 10/08/2021    CL 99 03/03/2022    CO2 25 03/03/2022    BUN 13 03/03/2022    CREATININE 0.8 03/03/2022    GFRAA >60 03/03/2022    LABGLOM >60 03/03/2022    GLUCOSE 85 03/03/2022    GLUCOSE 92 02/11/2011    PROT 8.0 03/03/2022    LABALBU 4.5 03/03/2022    CALCIUM 9.7 03/03/2022    BILITOT <0.2 03/03/2022    ALKPHOS 54 03/03/2022    AST 34 03/03/2022    ALT 23 03/03/2022     CT head- unrevealing     MRI brain report reviewed (no images for me to review today)-few nonspecific T2 hyperintensities, otherwise unremarkable    All labs and images were personally reviewed at the time of this visit    Assessment:     43year old woman with c/o headaches, vertigo, confusion, heart palpitations, tingling in the hands     Headaches sound to be chronic tension type headaches-- likely worsened by stressors, inadequate sleep, skipping meals    Improved and no longer on preventative therapy.      BPPV R ear    Improved after Vestibular therapy, not bothersome currently   Plan:     Monitor headaches off of medication at this time     RTO  prn     Call if headaches return    Damon Mcclain PA-C  8:32 AM  5/16/2022

## 2022-05-25 NOTE — PROGRESS NOTES
Date:  5/25/2022          Dear HEMANT Chappell:       This is to inform you that, as per Deanne Garsia, your patient Terrence Werner is as of todays date being discharged from Physical Therapy secondary to the following reasons:       1. If a client is absent for 50% of scheduled visits during a one-month period, he or she will be discharged from physical therapy services. A new prescription will be necessary to resume physical therapy. 2. Patient attended 3 physical therapy sessions. Patient cancelled last scheduled appointment due to illness and did not return. Unable to obtain discharge measurements due to pt cancellation. If you have any questions, please feel free to call at [phone]      Thank you          Talia Willingham [place], [id number]    [phone]    The information contained in this Fax the designated recipients intend message only for the personal and confidential use named above. This information is to be handled as privileged and confidential.  If the reader of this message is not the intended recipient, you are hereby notified that you have received this document in error and that any review, dissemination, distribution or copying of this message is strictly prohibited. If you receive this communication in error, please notify us immediately at the above number and return the original to us by mail.   Thank you      [facility]  [address], [address] [facility], [address] [place] [address]

## 2022-06-07 ENCOUNTER — TELEPHONE (OUTPATIENT)
Dept: CARDIOLOGY | Age: 37
End: 2022-06-07

## 2022-06-10 ENCOUNTER — HOSPITAL ENCOUNTER (OUTPATIENT)
Dept: CARDIOLOGY | Age: 37
Discharge: HOME | End: 2022-06-10
Payer: COMMERCIAL

## 2022-06-10 DIAGNOSIS — R06.09 DYSPNEA ON EXERTION: ICD-10-CM

## 2022-06-10 LAB
LV EF: 60 %
LVEF MODALITY: NORMAL

## 2022-06-10 PROCEDURE — 93306 TTE W/DOPPLER COMPLETE: CPT | Performed by: PSYCHIATRY & NEUROLOGY

## 2022-06-13 ENCOUNTER — TELEPHONE (OUTPATIENT)
Dept: CARDIOLOGY CLINIC | Age: 37
End: 2022-06-13

## 2022-06-17 NOTE — TELEPHONE ENCOUNTER
Patient notified and instructed on echo results and recommendations per Dr. Akash Fortune.
[id number]

## 2023-10-10 ENCOUNTER — HOSPITAL ENCOUNTER (OUTPATIENT)
Dept: GENERAL RADIOLOGY | Age: 38
Discharge: HOME | End: 2023-10-12
Payer: COMMERCIAL

## 2023-10-10 VITALS — BODY MASS INDEX: 35.31 KG/M2 | HEIGHT: 67 IN | WEIGHT: 225 LBS

## 2023-10-10 DIAGNOSIS — N64.4 PAIN OF LEFT BREAST: ICD-10-CM

## 2023-10-10 DIAGNOSIS — N60.22 FIBROADENOSIS OF BOTH BREASTS: ICD-10-CM

## 2023-10-10 DIAGNOSIS — N60.21 FIBROADENOSIS OF BOTH BREASTS: ICD-10-CM

## 2023-10-10 DIAGNOSIS — R92.8 ABNORMAL MAMMOGRAM OF LEFT BREAST: ICD-10-CM

## 2023-10-10 PROCEDURE — G0279 TOMOSYNTHESIS, MAMMO: HCPCS

## 2023-10-10 PROCEDURE — 76642 ULTRASOUND BREAST LIMITED: CPT

## 2024-05-01 ENCOUNTER — HOSPITAL ENCOUNTER (OUTPATIENT)
Dept: GENERAL RADIOLOGY | Age: 39
Discharge: HOME | End: 2024-05-03
Payer: COMMERCIAL

## 2024-05-01 VITALS — BODY MASS INDEX: 33.75 KG/M2 | HEIGHT: 66 IN | WEIGHT: 210 LBS

## 2024-05-01 DIAGNOSIS — R92.8 ABNORMAL MAMMOGRAM OF LEFT BREAST: ICD-10-CM

## 2024-05-01 PROCEDURE — G0279 TOMOSYNTHESIS, MAMMO: HCPCS

## 2024-10-28 ENCOUNTER — HOSPITAL ENCOUNTER (EMERGENCY)
Age: 39
Discharge: HOME | End: 2024-10-28
Attending: EMERGENCY MEDICINE

## 2024-10-28 ENCOUNTER — APPOINTMENT (OUTPATIENT)
Dept: GENERAL RADIOLOGY | Age: 39
End: 2024-10-28

## 2024-10-28 VITALS
HEART RATE: 92 BPM | DIASTOLIC BLOOD PRESSURE: 92 MMHG | TEMPERATURE: 98 F | SYSTOLIC BLOOD PRESSURE: 168 MMHG | OXYGEN SATURATION: 98 % | RESPIRATION RATE: 16 BRPM

## 2024-10-28 DIAGNOSIS — R05.9 COUGH, UNSPECIFIED TYPE: ICD-10-CM

## 2024-10-28 DIAGNOSIS — J40 BRONCHITIS: Primary | ICD-10-CM

## 2024-10-28 LAB
FLUAV RNA RESP QL NAA+PROBE: NOT DETECTED
FLUBV RNA RESP QL NAA+PROBE: NOT DETECTED
SARS-COV-2 RNA RESP QL NAA+PROBE: NOT DETECTED
SOURCE: NORMAL
SPECIMEN DESCRIPTION: NORMAL

## 2024-10-28 PROCEDURE — 71046 X-RAY EXAM CHEST 2 VIEWS: CPT

## 2024-10-28 PROCEDURE — 6370000000 HC RX 637 (ALT 250 FOR IP): Performed by: EMERGENCY MEDICINE

## 2024-10-28 PROCEDURE — 87636 SARSCOV2 & INF A&B AMP PRB: CPT

## 2024-10-28 PROCEDURE — 99284 EMERGENCY DEPT VISIT MOD MDM: CPT

## 2024-10-28 RX ORDER — DOXYCYCLINE 100 MG/1
100 CAPSULE ORAL ONCE
Status: COMPLETED | OUTPATIENT
Start: 2024-10-28 | End: 2024-10-28

## 2024-10-28 RX ORDER — PREDNISONE 20 MG/1
60 TABLET ORAL ONCE
Status: COMPLETED | OUTPATIENT
Start: 2024-10-28 | End: 2024-10-28

## 2024-10-28 RX ORDER — DOXYCYCLINE 100 MG/1
100 CAPSULE ORAL 2 TIMES DAILY
Qty: 20 CAPSULE | Refills: 0 | Status: SHIPPED | OUTPATIENT
Start: 2024-10-28 | End: 2024-11-07

## 2024-10-28 RX ORDER — PREDNISONE 10 MG/1
TABLET ORAL
Qty: 20 TABLET | Refills: 0 | Status: SHIPPED | OUTPATIENT
Start: 2024-10-28 | End: 2024-11-07

## 2024-10-28 RX ORDER — ALBUTEROL SULFATE 90 UG/1
2 INHALANT RESPIRATORY (INHALATION) EVERY 6 HOURS PRN
Qty: 18 G | Refills: 0 | Status: SHIPPED | OUTPATIENT
Start: 2024-10-28 | End: 2024-11-04

## 2024-10-28 RX ADMIN — DOXYCYCLINE HYCLATE 100 MG: 100 CAPSULE ORAL at 15:19

## 2024-10-28 RX ADMIN — PREDNISONE 60 MG: 20 TABLET ORAL at 15:19

## 2024-10-28 NOTE — ED PROVIDER NOTES
patient is NOT to be included for SEP-1 Core Measure due to:  Infection is not suspected          Medical Decision Making/Differential Diagnosis:    CC/HPI Summary, Social Determinants of health, Records Reviewed, DDx, testing done/not done, ED Course, Reassessment, disposition considerations/shared decision making with patient, consults, disposition:                    39-year-old female history of daily tobacco smoking presents to ER for complaint of chronic cough for the past 6 weeks.  Patient states that she has some chest tightness but no pleuritic pain, no nausea or vomiting, no fevers or chills.  Tmax of 99.2 reported at home.  No sick contacts, no recent travels.  On exam patient is nontoxic appearance, afebrile, hemodynamically stable, no signs of distress, patient has faint focal wheeze in the right upper lung field otherwise normal work of breathing, no rales or rhonchi, no peripheral edema, no calf tenderness, peripheral pulses equal all 4 extremities.  Differential diagnosis includes but not limited to bronchitis, viral illness, pneumonia.  Patient's chest x-ray showed no concern for infiltrates or effusions, patient is negative for flu and COVID.  Due to chronicity of symptoms, will treat patient's symptoms with doxycycline, prednisone, albuterol inhaler.  Given patient's reassuring vital signs, nontoxic appearance upon reevaluation at bedside, patient felt to be stable for discharge with close outpatient follow-up at this time.  Patient's results were explained, given opportunity to ask questions.  Patient is comfortable with discharge plan with close outpatient follow-up.  Strict return precautions discussed at length with patient, verbalizes understanding.  Patient discharged in stable condition.      CONSULTS:   None        PROCEDURES   Unless otherwise noted below, none       CRITICAL CARE TIME (.cct)           I am the Primary Clinician of Record.    FINAL IMPRESSION      1. Bronchitis    2.

## (undated) DEVICE — LUBRICANT SURG JELLY ST BACTER TUBE 4.25OZ

## (undated) DEVICE — 6 X 9  1.75MIL 4-WALL LABGUARD: Brand: MINIGRIP COMMERCIAL LLC

## (undated) DEVICE — Z INACTIVE USE 2660664 SOLUTION IRRIG 3000ML 0.9% SOD CHL USP UROMATIC PLAS CONT

## (undated) DEVICE — COVER,LIGHT HANDLE,FLX,1/PK: Brand: MEDLINE INDUSTRIES, INC.

## (undated) DEVICE — GAUZE,SPONGE,4"X4",16PLY,XRAY,STRL,LF: Brand: MEDLINE

## (undated) DEVICE — SYRINGE MED 20ML STD CLR PLAS LUERLOCK TIP N CTRL DISP

## (undated) DEVICE — GOWN ISOLATN REG YEL M WT MULTIPLY SIDETIE LEV 2

## (undated) DEVICE — PAD MATERNITY CURITY ADH STRIP DISP

## (undated) DEVICE — MASK,FACE,MAXFLUIDPROTECT,SHIELD/ERLPS: Brand: MEDLINE

## (undated) DEVICE — GOWN,SIRUS,NONRNF,SETINSLV,XL,20/CS: Brand: MEDLINE

## (undated) DEVICE — KIT BEDSIDE REVITAL OX 500ML

## (undated) DEVICE — CONTAINER SPEC COLL 960ML POLYPR TRIANG GRAD INTAKE/OUTPUT

## (undated) DEVICE — TUBING, SUCTION, 1/4" X 10', STRAIGHT: Brand: MEDLINE

## (undated) DEVICE — GLOVE ORANGE PI 7   MSG9070

## (undated) DEVICE — MARKER,SKIN,WI/RULER AND LABELS: Brand: MEDLINE

## (undated) DEVICE — GLOVE ORANGE PI 7 1/2   MSG9075

## (undated) DEVICE — SYRINGE MED 50ML LUERLOCK TIP

## (undated) DEVICE — FORCEPS BX L240CM JAW DIA2.8MM L CAP W/ NDL MIC MESH TOOTH

## (undated) DEVICE — TOWEL,OR,DSP,ST,BLUE,STD,6/PK,12PK/CS: Brand: MEDLINE

## (undated) DEVICE — Device

## (undated) DEVICE — BLOCK BITE 60FR CAREGUARD

## (undated) DEVICE — YANKAUER,BULB TIP,W/O VENT,RIGID,STERILE: Brand: MEDLINE

## (undated) DEVICE — TRAY PROCED DILATATION CURETTAGE

## (undated) DEVICE — CATHETER,URETHRAL,VINYL,MALE,16",16 FR: Brand: MEDLINE

## (undated) DEVICE — PAD,NON-ADHERENT,3X8,STERILE,LF,1/PK: Brand: MEDLINE

## (undated) DEVICE — LARGE BORE STOPCOCK WITH ROTATING MALE LUER LOCK

## (undated) DEVICE — CAMERA STRYKER 1488 HD GEN

## (undated) DEVICE — TRAY PROCED HYSTEROSCOPY CIRCON 1

## (undated) DEVICE — Device: Brand: DEFENDO VALVE AND CONNECTOR KIT

## (undated) DEVICE — SPONGE GZ 4IN 4IN 4 PLY N WVN AVANT

## (undated) DEVICE — GOWN,SIRUS,POLYRNF,BRTHSLV,XLN/XL,20/CS: Brand: MEDLINE

## (undated) DEVICE — LENS CORD GYN 0-DEG 5 MM CIRCON

## (undated) DEVICE — TRAY,VAG PREP,2PR VNYL GLV,4 C: Brand: MEDLINE INDUSTRIES, INC.

## (undated) DEVICE — PACK PROC 3IN1 W/ L12FT DIA0.25IN REINF SUCT TBNG W50XL901IN

## (undated) DEVICE — SPECIMEN SOCK - STANDARD: Brand: MEDI-VAC

## (undated) DEVICE — KIT EXTN LN IV CONDUCT FLUIDS FOR GRAVITATIONAL IV ADMIN SGL

## (undated) DEVICE — DOUBLE BASIN SET: Brand: MEDLINE INDUSTRIES, INC.

## (undated) DEVICE — KENDALL 450 SERIES MONITORING FOAM ELECTRODE - RECTANGULAR SHAPE ( 3/PK): Brand: KENDALL